# Patient Record
Sex: MALE | Race: WHITE | URBAN - NONMETROPOLITAN AREA
[De-identification: names, ages, dates, MRNs, and addresses within clinical notes are randomized per-mention and may not be internally consistent; named-entity substitution may affect disease eponyms.]

---

## 2024-09-27 ENCOUNTER — DOCUMENTATION (OUTPATIENT)
Dept: CARDIOLOGY | Facility: CLINIC | Age: 69
End: 2024-09-27
Payer: COMMERCIAL

## 2024-10-03 ENCOUNTER — TELEPHONE (OUTPATIENT)
Dept: CARDIOLOGY | Facility: CLINIC | Age: 69
End: 2024-10-03

## 2024-10-03 DIAGNOSIS — I47.29 MONOMORPHIC VENTRICULAR TACHYCARDIA (MULTI): ICD-10-CM

## 2024-10-03 NOTE — TELEPHONE ENCOUNTER
Patient admitted to [facility] on 9/27/2024; Chief complaint: Anterior STEMI.    Underwent Cardiac Cath 9/27/2024: POST PROCEDURE  Cardiology Post-Op Diagnosis: STEMI (Thrombotic occlusion proximal LAD, mild 30-50% RCA disease, moderate LV dysfunction)    Underwent Angioplasty 9/27/2024; POST PROCEDURE  Cardiology Post-Op Diagnosis: STEMI (Successful mechanical thrombectomy, PCI proximal LAD with 4 x 38 mm Philipp)    Update: 9/29/2024; Case discussed with Dr. Kumar/APS.  Based on recurrent sustained VT  48 hours after indexed myocardial ischemic event on background of ischemic cardiomyopathy patient will need a dual AICD which hopefully will be arranged for Wednesday.    Underwent ICD implant 10/2/2024;   PRE-OP DIAGNOSIS  Sustained monomorphic ventricular tachycardia  POST-OP DIAGNOSIS  Sustained monomorphic ventricular tachycardia  PROCEDURE(S) PERFORMED  Implant of an Abbott dual-chamber cardiac defibrillator    IMPLANT(S)  ICD pulse generator is Abbott model CD DR A 500 Q serial #[id number] implanted October 2, 2024    Atrial lead is and with 2088 length 46 cm serial number EE [id number] implanted October 2, 2024    ICD lead is Abbott  [id number].  Length is 56 cm serial number EE [id number] planted October 2, 2024  FINDINGS  Atrial lead pacing threshold 1 V at 0.5 ms pulse width sensing amplitude 4.3 mV and impedance 440 ohms    ICD lead pacing threshold 0.75 at 0.5 ms pulse width and sensing 4.3 mV and     impedance 440 ohms    Device programmed to the DDDR lower rate 60    AT monitor zone 150 bpm.  VT therapy zone 171 bpm.  VF detection and therapy zone to 14 bpm    At discharge patient needs: PAC[place] [facility] [place] EARLY OFFICE VISIT POST MI AND  WEEK POST ICD IMPLANT FOLLOW UP.

## 2024-10-04 ENCOUNTER — PATIENT OUTREACH (OUTPATIENT)
Dept: CARDIOLOGY | Facility: CLINIC | Age: 69
End: 2024-10-04
Payer: MEDICARE

## 2024-10-04 ENCOUNTER — TELEPHONE (OUTPATIENT)
Dept: CARDIOLOGY | Facility: CLINIC | Age: 69
End: 2024-10-04
Payer: MEDICARE

## 2024-10-04 RX ORDER — ASPIRIN 81 MG/1
81 TABLET ORAL DAILY
COMMUNITY

## 2024-10-04 RX ORDER — OMEPRAZOLE 20 MG/1
20 CAPSULE, DELAYED RELEASE ORAL
COMMUNITY

## 2024-10-04 RX ORDER — NITROGLYCERIN 0.4 MG/1
0.4 TABLET SUBLINGUAL EVERY 5 MIN PRN
COMMUNITY

## 2024-10-04 RX ORDER — ATORVASTATIN CALCIUM 80 MG/1
80 TABLET, FILM COATED ORAL DAILY
COMMUNITY

## 2024-10-04 RX ORDER — CARVEDILOL 6.25 MG/1
6.25 TABLET ORAL 2 TIMES DAILY
COMMUNITY

## 2024-10-04 RX ORDER — BUPROPION HYDROCHLORIDE 300 MG/1
300 TABLET ORAL DAILY
COMMUNITY

## 2024-10-04 RX ORDER — GABAPENTIN 800 MG/1
800 TABLET ORAL DAILY
COMMUNITY

## 2024-10-04 RX ORDER — AMIODARONE HYDROCHLORIDE 200 MG/1
200 TABLET ORAL 2 TIMES DAILY
COMMUNITY

## 2024-10-04 RX ORDER — LISINOPRIL 5 MG/1
5 TABLET ORAL DAILY
COMMUNITY

## 2024-10-04 NOTE — PROGRESS NOTES
Discharge Facility:  [facility]     Discharge Diagnosis:  STEMI  V Tach  YRIS  S/P PCI to the LAD 9/27/24  S/P AICD 10/2/24    Admission Date:  9/27/24  Discharge Date:   10/3/24    PCP Appointment Date:  TBD  Specialist Appointment:   10/9/24 Dr Sheppard   [facility] Encounter and Summary Linked: Yes    Two attempts were made to reach patient within two business days after discharge. I left voicemail to introduce myself and the TCM program to Venkat Cohen. I encouraged patient to contact office or myself for follow up appt. I gave my contact information to return my call so we can go over discharge instructions and see if I can assist in anyway.

## 2024-10-04 NOTE — TELEPHONE ENCOUNTER
Kamilah from Dr. Reyna's office calls stating patient has not had a bowel movement since discharge. He is only experiencing clear fluid and he is not eating anything either. Patient had heart cath when hospitalized and a defibrillator placed. She states patient also mentioned Sob when lying down as well. She states she was calling patient to direct him to go to ER.

## 2024-10-04 NOTE — TELEPHONE ENCOUNTER
In Dr. Lev Sheppard DO absence, please advise    Patient's wife phones stating pt was discharged from [facility] yesterday after having an anterior STEMI in which he had stents placed by Dr. Lev Sheppard DO. Wife states patient also saw Dr. Markus Jennings MD. Discharge summary available under media.     Patient is complaining of SOB when lying down and wet sounding lungs which then improves when sitting up or walking around. He is denying any other cardiac complaints at this time.     His wife states he was given lasix when hospitalized and she is wondering if he should be prescribed more to help with symptoms?    Patient scheduled with Dr. Lev Sheppard DO 10/9 for hospital follow up.

## 2024-10-07 NOTE — TELEPHONE ENCOUNTER
Spoke with patient, he states he saw pcp this morning who feels SOB is related to brilinta as well. Patient has pending OV 10/9. They are inquiring on alternative to brilinta. Please advise

## 2024-10-08 NOTE — TELEPHONE ENCOUNTER
Wife phones back inquiring on status of medications. Informed her meds were sent to Dr. Lev Sheppard DO for signature and once he signs them she will be able to pick them up.

## 2024-10-08 NOTE — TELEPHONE ENCOUNTER
Spoke with wife regarding recommendations. She is agreeable to have patient start medications. She voices concerns with regards to the brilinta causing the SOB. Informed wife patient needs to remain on brilinta and SOB could be likely related to LV dysfunction.    Patient has pending OV 10/9 where wife would like to discuss more.     Orders prepped and sent to Dr. Lev Sheppard DO for signature.     When lab order is signed please fax to [facility]

## 2024-10-09 ENCOUNTER — APPOINTMENT (OUTPATIENT)
Dept: CARDIOLOGY | Facility: CLINIC | Age: 69
End: 2024-10-09
Payer: COMMERCIAL

## 2024-10-09 VITALS
HEART RATE: 60 BPM | WEIGHT: 226.6 LBS | DIASTOLIC BLOOD PRESSURE: 60 MMHG | SYSTOLIC BLOOD PRESSURE: 98 MMHG | BODY MASS INDEX: 32.44 KG/M2 | HEIGHT: 70 IN

## 2024-10-09 DIAGNOSIS — R07.9 CHEST PAIN, UNSPECIFIED TYPE: ICD-10-CM

## 2024-10-09 DIAGNOSIS — I51.9 LV DYSFUNCTION: ICD-10-CM

## 2024-10-09 DIAGNOSIS — G47.33 OBSTRUCTIVE SLEEP APNEA: ICD-10-CM

## 2024-10-09 DIAGNOSIS — I47.29 VENTRICULAR TACHYCARDIA, MONOMORPHIC (MULTI): ICD-10-CM

## 2024-10-09 DIAGNOSIS — R06.02 SHORTNESS OF BREATH: ICD-10-CM

## 2024-10-09 DIAGNOSIS — I25.10 CORONARY ARTERY DISEASE, UNSPECIFIED VESSEL OR LESION TYPE, UNSPECIFIED WHETHER ANGINA PRESENT, UNSPECIFIED WHETHER NATIVE OR TRANSPLANTED HEART: ICD-10-CM

## 2024-10-09 DIAGNOSIS — Z78.9 NEVER SMOKED TOBACCO: ICD-10-CM

## 2024-10-09 DIAGNOSIS — I25.5 CARDIOMYOPATHY, ISCHEMIC: ICD-10-CM

## 2024-10-09 DIAGNOSIS — I21.3 ST ELEVATION MYOCARDIAL INFARCTION (STEMI), UNSPECIFIED ARTERY (MULTI): ICD-10-CM

## 2024-10-09 DIAGNOSIS — Z98.61 HISTORY OF PTCA: ICD-10-CM

## 2024-10-09 DIAGNOSIS — Z95.810 ICD (IMPLANTABLE CARDIOVERTER-DEFIBRILLATOR) IN PLACE: ICD-10-CM

## 2024-10-09 PROBLEM — I47.20 VENTRICULAR TACHYCARDIA (MULTI): Status: ACTIVE | Noted: 2024-10-09

## 2024-10-09 PROBLEM — I51.89 LEFT VENTRICULAR SYSTOLIC DYSFUNCTION (LVSD): Status: ACTIVE | Noted: 2024-10-09

## 2024-10-09 RX ORDER — SPIRONOLACTONE 25 MG/1
12.5 TABLET ORAL DAILY
Qty: 45 TABLET | Refills: 3 | Status: SHIPPED | OUTPATIENT
Start: 2024-10-09 | End: 2024-10-10 | Stop reason: DRUGHIGH

## 2024-10-09 RX ORDER — LOSARTAN POTASSIUM 25 MG/1
12.5 TABLET ORAL DAILY
Qty: 45 TABLET | Refills: 3 | Status: SHIPPED | OUTPATIENT
Start: 2024-10-09 | End: 2025-10-09

## 2024-10-09 ASSESSMENT — ENCOUNTER SYMPTOMS: SHORTNESS OF BREATH: 1

## 2024-10-09 NOTE — LETTER
October 9, 2024     Leon Reyna DO  [address]  [place] [address]    Patient: Venkat Cohen   YOB: 1955   Date of Visit: 10/9/2024       Dear Dr. Leon Reyna DO:    Thank you for referring Venkat Cohen to me for evaluation. Below are my notes for this consultation.  If you have questions, please do not hesitate to call me. I look forward to following your patient along with you.       Sincerely,     Lev Sheppard DO      CC: No Recipients  ______________________________________________________________________________________    Subjective   Venkat Cohen is a 69 y.o. male       Chief Complaint    Follow-up          69-year-old gentleman returns for TCM office visit status post large anteroapical ST elevation MI treated with mechanical thrombectomy, and stenting of the proximal/mid LAD.  He has overall moderately severe LV dysfunction, evidence of nonsustained ventricular tachycardia and underwent AICD treatment and placement prior to discharge.  He has had no further events.    Upon discharge he had significant exertional dyspnea, inability to sleep, all of which have improved over the past 48 hours.    Remains on amiodarone in addition to DAPT.  Additionally he is on lisinopril and has significant cough and shortness of breath which potentially could be related to his lisinopril.  He had significant orthopnea and dyspnea but again improving.    Today's ECG reveals sinus rhythm with atrial pacing, right bundle branch configuration with residual anterolateral ST elevation consistent with possible aneurysmal formation.    We did spend a lot of time discussing medications, side effects, LV dysfunction and heart failure versus dyspnea associated with medications and cough.    Recommendations: Will discontinue lisinopril and initiate losartan 12.5 mg daily, he has not taken his spironolactone or furosemide as of yet and will also reinitiate Aldactone 12.5 mg daily, schedule  "MUGA scan for next 8 weeks, follow-up in 12 weeks for further adjudication.         Review of Systems   Respiratory:  Positive for shortness of breath.    All other systems reviewed and are negative.           Vitals:    10/09/24 1129   BP: 98/60   BP Location: Right arm   Patient Position: Sitting   Pulse: 60   Weight: 103 kg (226 lb 9.6 oz)   Height: 1.765 m (5' 9.5\")    EKG done in office today      Objective   Physical Exam  Constitutional:       Appearance: Normal appearance.   HENT:      Nose: Nose normal.   Neck:      Vascular: No carotid bruit.   Cardiovascular:      Rate and Rhythm: Normal rate.      Pulses: Normal pulses.      Heart sounds: Normal heart sounds.   Pulmonary:      Effort: Pulmonary effort is normal.      Breath sounds: Normal breath sounds.      Comments: Shortness of breath   Abdominal:      General: Bowel sounds are normal.      Palpations: Abdomen is soft.   Musculoskeletal:         General: Normal range of motion.      Cervical back: Normal range of motion.      Right lower leg: No edema.      Left lower leg: No edema.   Skin:     General: Skin is warm and dry.   Neurological:      General: No focal deficit present.      Mental Status: He is alert.   Psychiatric:         Mood and Affect: Mood normal.         Behavior: Behavior normal.         Thought Content: Thought content normal.         Judgment: Judgment normal.         Allergies  Ibuprofen     Current Medications    Current Outpatient Medications:   •  amiodarone (Pacerone) 200 mg tablet, Take 1 tablet (200 mg) by mouth 2 times a day., Disp: , Rfl:   •  aspirin 81 mg EC tablet, Take 1 tablet (81 mg) by mouth once daily., Disp: , Rfl:   •  atorvastatin (Lipitor) 80 mg tablet, Take 1 tablet (80 mg) by mouth once daily., Disp: , Rfl:   •  buPROPion XL (Wellbutrin XL) 300 mg 24 hr tablet, Take 1 tablet (300 mg) by mouth once daily. Do not crush, chew, or split., Disp: , Rfl:   •  carvedilol (Coreg) 6.25 mg tablet, Take 1 tablet (6.25 " mg) by mouth 2 times a day., Disp: , Rfl:   •  furosemide (Lasix) 20 mg tablet, Take 1 tablet (20 mg) by mouth once daily., Disp: 90 tablet, Rfl: 3  •  gabapentin (Neurontin) 800 mg tablet, Take 1 tablet (800 mg) by mouth 3 times a day., Disp: , Rfl:   •  nitroglycerin (Nitrostat) 0.4 mg SL tablet, Place 1 tablet (0.4 mg) under the tongue every 5 minutes if needed for chest pain., Disp: , Rfl:   •  omeprazole (PriLOSEC) 20 mg DR capsule, Take 1 capsule (20 mg) by mouth once daily in the morning. Take before meals. Do not crush or chew., Disp: , Rfl:   •  spironolactone (Aldactone) 25 mg tablet, Take 1 tablet (25 mg) by mouth once daily., Disp: 90 tablet, Rfl: 3  •  ticagrelor (Brilinta) 90 mg tablet, Take 1 tablet (90 mg) by mouth 2 times a day., Disp: , Rfl:                      Assessment/Plan   1. Coronary artery disease, unspecified vessel or lesion type, unspecified whether angina present, unspecified whether native or transplanted heart        2. History of PTCA        3. Cardiomyopathy, ischemic        4. ST elevation myocardial infarction (STEMI), unspecified artery (Multi)        5. ICD (implantable cardioverter-defibrillator) in place        6. Ventricular tachycardia, monomorphic (Multi)        7. Shortness of breath        8. Chest pain, unspecified type        9. Obstructive sleep apnea        10. BMI 32.0-32.9,adult        11. Never smoked tobacco                 Scribe Attestation  By signing my name below, I, Susan ANDRADE RN   , Scribe   attest that this documentation has been prepared under the direction and in the presence of Lev Sheppard DO.     Provider Attestation - Scribe documentation    All medical record entries made by the Scribe were at my direction and personally dictated by me. I have reviewed the chart and agree that the record accurately reflects my personal performance of the history, physical exam, discussion and plan.

## 2024-10-09 NOTE — PATIENT INSTRUCTIONS
Please bring all medicines, vitamins, and herbal supplements with you when you come to the office.    Prescriptions will not be filled unless you are compliant with your follow up appointments or have a follow up appointment scheduled as per instruction of your physician. Refills should be requested at the time of your visit.     BMI was above normal measurement. Current weight: 103 kg (226 lb 9.6 oz)  Weight change since last visit (-) denotes wt loss 226.6 lbs   Weight loss needed to achieve BMI 25: 55.2 Lbs  Weight loss needed to achieve BMI 30: 20.9 Lbs  Provided instructions on dietary changes  Provided instructions on exercise.    Pacemaker/Defibrillator follow up per routine     Stop lisinopril and start losartan 12.5 mg daily     Spironolactone 12.5 mg daily     Hold lasix for now

## 2024-10-09 NOTE — PROGRESS NOTES
"Subjective   Venkat Cohen is a 69 y.o. male       Chief Complaint    Follow-up          69-year-old gentleman returns for TCM office visit status post large anteroapical ST elevation MI treated with mechanical thrombectomy, and stenting of the proximal/mid LAD.  He has overall moderately severe LV dysfunction, evidence of nonsustained ventricular tachycardia and underwent AICD treatment and placement prior to discharge.  He has had no further events.    Upon discharge he had significant exertional dyspnea, inability to sleep, all of which have improved over the past 48 hours.    Remains on amiodarone in addition to DAPT.  Additionally he is on lisinopril and has significant cough and shortness of breath which potentially could be related to his lisinopril.  He had significant orthopnea and dyspnea but again improving.    Today's ECG reveals sinus rhythm with atrial pacing, right bundle branch configuration with residual anterolateral ST elevation consistent with possible aneurysmal formation.    We did spend a lot of time discussing medications, side effects, LV dysfunction and heart failure versus dyspnea associated with medications and cough.    Recommendations: Will discontinue lisinopril and initiate losartan 12.5 mg daily, he has not taken his spironolactone or furosemide as of yet and will also reinitiate Aldactone 12.5 mg daily, schedule MUGA scan for next 8 weeks, follow-up in 12 weeks for further adjudication.         Review of Systems   Respiratory:  Positive for shortness of breath.    All other systems reviewed and are negative.           Vitals:    10/09/24 1129   BP: 98/60   BP Location: Right arm   Patient Position: Sitting   Pulse: 60   Weight: 103 kg (226 lb 9.6 oz)   Height: 1.765 m (5' 9.5\")    EKG done in office today      Objective   Physical Exam  Constitutional:       Appearance: Normal appearance.   HENT:      Nose: Nose normal.   Neck:      Vascular: No carotid bruit.   Cardiovascular:      " Rate and Rhythm: Normal rate.      Pulses: Normal pulses.      Heart sounds: Normal heart sounds.   Pulmonary:      Effort: Pulmonary effort is normal.      Breath sounds: Normal breath sounds.      Comments: Shortness of breath   Abdominal:      General: Bowel sounds are normal.      Palpations: Abdomen is soft.   Musculoskeletal:         General: Normal range of motion.      Cervical back: Normal range of motion.      Right lower leg: No edema.      Left lower leg: No edema.   Skin:     General: Skin is warm and dry.   Neurological:      General: No focal deficit present.      Mental Status: He is alert.   Psychiatric:         Mood and Affect: Mood normal.         Behavior: Behavior normal.         Thought Content: Thought content normal.         Judgment: Judgment normal.         Allergies  Ibuprofen     Current Medications    Current Outpatient Medications:     amiodarone (Pacerone) 200 mg tablet, Take 1 tablet (200 mg) by mouth 2 times a day., Disp: , Rfl:     aspirin 81 mg EC tablet, Take 1 tablet (81 mg) by mouth once daily., Disp: , Rfl:     atorvastatin (Lipitor) 80 mg tablet, Take 1 tablet (80 mg) by mouth once daily., Disp: , Rfl:     buPROPion XL (Wellbutrin XL) 300 mg 24 hr tablet, Take 1 tablet (300 mg) by mouth once daily. Do not crush, chew, or split., Disp: , Rfl:     carvedilol (Coreg) 6.25 mg tablet, Take 1 tablet (6.25 mg) by mouth 2 times a day., Disp: , Rfl:     furosemide (Lasix) 20 mg tablet, Take 1 tablet (20 mg) by mouth once daily., Disp: 90 tablet, Rfl: 3    gabapentin (Neurontin) 800 mg tablet, Take 1 tablet (800 mg) by mouth 3 times a day., Disp: , Rfl:     nitroglycerin (Nitrostat) 0.4 mg SL tablet, Place 1 tablet (0.4 mg) under the tongue every 5 minutes if needed for chest pain., Disp: , Rfl:     omeprazole (PriLOSEC) 20 mg DR capsule, Take 1 capsule (20 mg) by mouth once daily in the morning. Take before meals. Do not crush or chew., Disp: , Rfl:     spironolactone (Aldactone) 25 mg  tablet, Take 1 tablet (25 mg) by mouth once daily., Disp: 90 tablet, Rfl: 3    ticagrelor (Brilinta) 90 mg tablet, Take 1 tablet (90 mg) by mouth 2 times a day., Disp: , Rfl:                      Assessment/Plan   1. Coronary artery disease, unspecified vessel or lesion type, unspecified whether angina present, unspecified whether native or transplanted heart        2. History of PTCA        3. Cardiomyopathy, ischemic        4. ST elevation myocardial infarction (STEMI), unspecified artery (Multi)        5. ICD (implantable cardioverter-defibrillator) in place        6. Ventricular tachycardia, monomorphic (Multi)        7. Shortness of breath        8. Chest pain, unspecified type        9. Obstructive sleep apnea        10. BMI 32.0-32.9,adult        11. Never smoked tobacco                 Scribe Attestation  By signing my name below, I, Susan ANDRADE RN   , Scribe   attest that this documentation has been prepared under the direction and in the presence of Lev Sheppard DO.     Provider Attestation - Scribe documentation    All medical record entries made by the Scribe were at my direction and personally dictated by me. I have reviewed the chart and agree that the record accurately reflects my personal performance of the history, physical exam, discussion and plan.

## 2024-10-09 NOTE — TELEPHONE ENCOUNTER
Lab order printed to be given to patient at OV today 10/9    Also faxed to [facility]

## 2024-10-10 NOTE — TELEPHONE ENCOUNTER
Wife phones in stating patient's shortness of breath and coughing has worsened. She states this is the worst night he has had since he was discharged from the hospital. He feels very fatigued and reports that he does not feel he is getting all of the oxygen that he needs.    Instructed patient if symptoms continue to wrosen to report to ER, they verbalized understanding and state they do not want to go back to the ER if nothing will be done.    They have made the med adjustments that were recommended from his visit yesterday but they are inquiring if he can be taken off of brilinta and placed on something else. Please advise. They are concerned the SOB and cough could be from brilinta.

## 2024-10-10 NOTE — TELEPHONE ENCOUNTER
Informed wife of recommendation to increase spironolactone to 25 mg and to stop brilinta and initiate plavix with loading dose of 600 mg x1 and then 75 mg each day after, she verbalized understanding.     Orders prepped and sent to Dr. Lev Sheppard DO for signature.     Patient will call next week with update on condition. Instructed should symptoms worsen in the meantime to report to ER, they verbalized understanding.

## 2024-10-18 ENCOUNTER — PATIENT OUTREACH (OUTPATIENT)
Dept: PRIMARY CARE | Facility: CLINIC | Age: 69
End: 2024-10-18
Payer: MEDICARE

## 2024-10-18 NOTE — PROGRESS NOTES
Call regarding appt. with Cardio on 10/9/24 after hospitalization.  At time of outreach call the patient feels as if their condition has improved since last visit.  Reviewed the appointments with the pt and addressed any questions or concerns.    Starts rehab next Tuesday

## 2024-10-21 ENCOUNTER — TELEPHONE (OUTPATIENT)
Dept: CARDIOLOGY | Facility: CLINIC | Age: 69
End: 2024-10-21
Payer: MEDICARE

## 2024-10-21 ENCOUNTER — PATIENT MESSAGE (OUTPATIENT)
Dept: CARDIOLOGY | Facility: CLINIC | Age: 69
End: 2024-10-21
Payer: MEDICARE

## 2024-10-21 NOTE — TELEPHONE ENCOUNTER
Faxed completed FMLA forms for spouse to [facility] [employer] as requested. Confirmation received. Phoned spouse with update.

## 2024-10-21 NOTE — TELEPHONE ENCOUNTER
Patient wife Brittany Cohen FMLA forms completed and placed on Dr. Lev Sheppard, DO desk for signature. Nursing will need to document and fax, then scan to chart once faxed.

## 2024-10-23 DIAGNOSIS — I51.9 LV DYSFUNCTION: ICD-10-CM

## 2024-10-23 NOTE — TELEPHONE ENCOUNTER
Patient spouse phones requesting review of recent blood work that was ordered by family doctor. Spouse states there were concerns regarding kidney function and it was thought to possibly be related to diuretic use. Please advise

## 2024-10-24 RX ORDER — FUROSEMIDE 20 MG/1
20 TABLET ORAL AS NEEDED
Start: 2024-10-24 | End: 2025-10-24

## 2024-10-24 NOTE — TELEPHONE ENCOUNTER
Spoke with patient and informed him Dr. Lev Sheppard, DO recommends he hold lasix and only use as needed. Patient is agreeable. Patient states he is having repeat blood work done 11/5 from pcp to re-evaluate labs. Will call with further concerns.

## 2024-10-30 DIAGNOSIS — R06.02 SHORTNESS OF BREATH: ICD-10-CM

## 2024-10-30 DIAGNOSIS — I51.9 LV DYSFUNCTION: ICD-10-CM

## 2024-10-31 RX ORDER — SPIRONOLACTONE 25 MG/1
25 TABLET ORAL 2 TIMES DAILY
Qty: 180 TABLET | Refills: 3 | Status: SHIPPED | OUTPATIENT
Start: 2024-10-31 | End: 2025-10-31

## 2024-11-04 LAB
NON-UH HIE ANION GAP: 12.2 (ref 6–15)
NON-UH HIE B-TYPE NATRIURETIC PEPTIDE: 1424 PG/ML (ref 5–100)
NON-UH HIE BLOOD UREA NITROGEN: 26 MG/DL (ref 7–25)
NON-UH HIE CALCIUM: 9.9 MG/DL (ref 8.6–10.3)
NON-UH HIE CARBON DIOXIDE: 20.2 MMOL/L (ref 21–31)
NON-UH HIE CHLORIDE: 106 MMOL/L (ref 98–107)
NON-UH HIE CREATININE: 1.68 MG/DL (ref 0.7–1.3)
NON-UH HIE ESTIMATED GFR: 43.45
NON-UH HIE GLUCOSE: 80 MG/DL (ref 70–100)
NON-UH HIE POTASSIUM: 4.4 MMOL/L (ref 3.5–5.1)
NON-UH HIE SODIUM: 134 MMOL/L (ref 136–145)

## 2024-11-15 ENCOUNTER — APPOINTMENT (OUTPATIENT)
Dept: CARDIOLOGY | Facility: CLINIC | Age: 69
End: 2024-11-15
Payer: MEDICARE

## 2024-11-15 VITALS
HEIGHT: 69 IN | WEIGHT: 208 LBS | HEART RATE: 60 BPM | DIASTOLIC BLOOD PRESSURE: 60 MMHG | SYSTOLIC BLOOD PRESSURE: 98 MMHG | BODY MASS INDEX: 30.81 KG/M2

## 2024-11-15 DIAGNOSIS — I21.3 ST ELEVATION MYOCARDIAL INFARCTION (STEMI), UNSPECIFIED ARTERY (MULTI): ICD-10-CM

## 2024-11-15 DIAGNOSIS — I47.29 VENTRICULAR TACHYCARDIA, MONOMORPHIC (MULTI): ICD-10-CM

## 2024-11-15 DIAGNOSIS — R06.02 SHORTNESS OF BREATH: ICD-10-CM

## 2024-11-15 DIAGNOSIS — I25.5 CARDIOMYOPATHY, ISCHEMIC: ICD-10-CM

## 2024-11-15 DIAGNOSIS — Z95.810 ICD (IMPLANTABLE CARDIOVERTER-DEFIBRILLATOR) IN PLACE: ICD-10-CM

## 2024-11-15 DIAGNOSIS — I25.10 ASHD (ARTERIOSCLEROTIC HEART DISEASE): ICD-10-CM

## 2024-11-15 DIAGNOSIS — Z79.899 HIGH RISK MEDICATION USE: ICD-10-CM

## 2024-11-15 DIAGNOSIS — I50.43 CHF (CONGESTIVE HEART FAILURE), NYHA CLASS III, ACUTE ON CHRONIC, COMBINED: ICD-10-CM

## 2024-11-15 DIAGNOSIS — Z78.9 NEVER SMOKED TOBACCO: ICD-10-CM

## 2024-11-15 DIAGNOSIS — Z98.61 HISTORY OF PTCA: ICD-10-CM

## 2024-11-15 PROCEDURE — 1159F MED LIST DOCD IN RCRD: CPT | Performed by: INTERNAL MEDICINE

## 2024-11-15 PROCEDURE — 3008F BODY MASS INDEX DOCD: CPT | Performed by: INTERNAL MEDICINE

## 2024-11-15 PROCEDURE — 1160F RVW MEDS BY RX/DR IN RCRD: CPT | Performed by: INTERNAL MEDICINE

## 2024-11-15 PROCEDURE — 99215 OFFICE O/P EST HI 40 MIN: CPT | Performed by: INTERNAL MEDICINE

## 2024-11-15 PROCEDURE — 1036F TOBACCO NON-USER: CPT | Performed by: INTERNAL MEDICINE

## 2024-11-15 PROCEDURE — 93000 ELECTROCARDIOGRAM COMPLETE: CPT | Performed by: INTERNAL MEDICINE

## 2024-11-15 RX ORDER — FUROSEMIDE 40 MG/1
40 TABLET ORAL DAILY
Qty: 90 TABLET | Refills: 3 | Status: SHIPPED | OUTPATIENT
Start: 2024-11-15 | End: 2025-11-15

## 2024-11-15 RX ORDER — BUMETANIDE 1 MG/1
1 TABLET ORAL DAILY
COMMUNITY
End: 2024-11-15 | Stop reason: ALTCHOICE

## 2024-11-15 ASSESSMENT — ENCOUNTER SYMPTOMS: SHORTNESS OF BREATH: 1

## 2024-11-15 NOTE — PATIENT INSTRUCTIONS
Please bring all medicines, vitamins, and herbal supplements with you when you come to the office.    Prescriptions will not be filled unless you are compliant with your follow up appointments or have a follow up appointment scheduled as per instruction of your physician. Refills should be requested at the time of your visit.   BMI was above normal measurement. Current weight: 94.3 kg (208 lb)  Weight change since last visit (-) denotes wt loss -18.6 lbs   Weight loss needed to achieve BMI 25: 39.1 Lbs  Weight loss needed to achieve BMI 30: 5.3 Lbs  Advised to Increase physical activity.  Pacemaker/Defibrillator follow up per routine   Amiodarone follow up per routine

## 2024-11-15 NOTE — PROGRESS NOTES
Subjective   Venkat Cohen is a 69 y.o. male       Chief Complaint    Follow-up          69-year-old gentleman returns for follow-up following recent large anterior ST elevation MI treated with primary revascularization of the proximal LAD with a large 4 mm drug-eluting stent and mechanical thrombectomy, complicated by no reflow phenomenon and runs of nonsustained VT.  He ended up with severe LV dysfunction.  Subsequent AICD was placed due to ventricular arrhythmias    1 week after discharge, he ended up back in the hospital with recurrence of pulmonary edema and heart failure treated effectively with aggressive diuresis.  His original event occurred September 27, today is now November 15 approximately 6 to 7 weeks postop.    After discharge, he went home on furosemide, followed up with his family physician, laboratories ordered including renal profile and BMP which noted acute renal insufficiency with creatinine 1.68 and elevated BNP of 1400.  At that time Lasix was held due to his renal insufficiency and as he was feeling so well and asymptomatic.    He has been participating in cardiac rehab 3 times a week, today he was feeling more fatigued and short of breath.  End of last week he went to his brothers in [facility] and went hunting, and had progressive disability, shortness of breath and fatigue and on the final day on Friday could not participate.  He went to the ER on Sunday, who was given Bumex for 7 days until he could see me.  Will need to review the ER reports however acute coronary syndrome was ruled out.    Today's ECG reveals atrial pacing with sinus rhythm, right axis deviation, incomplete right bundle branch block and resolving anterior ST injury current.    Impression: Resolving acute decompensated left ventricular systolic heart failure, anterior STEMI (recent); NYHA functional class II-III/C (improving)    Recommendations: Reinitiate furosemide 40 mg daily, obtain chemistry and BMP next Friday,  "limited echocardiogram December, and follow-up in mid December.         Review of Systems   Respiratory:  Positive for shortness of breath.    All other systems reviewed and are negative.           Vitals:    11/15/24 1005   BP: 98/60   BP Location: Left arm   Patient Position: Sitting   Pulse: 60   Weight: 94.3 kg (208 lb)   Height: 1.753 m (5' 9\")      EKG done in office today   Objective   Physical Exam  Constitutional:       Appearance: Normal appearance.   HENT:      Nose: Nose normal.   Neck:      Vascular: No carotid bruit.   Cardiovascular:      Rate and Rhythm: Normal rate.      Pulses: Normal pulses.      Heart sounds: Normal heart sounds.   Pulmonary:      Effort: Pulmonary effort is normal.   Abdominal:      General: Bowel sounds are normal.      Palpations: Abdomen is soft.   Musculoskeletal:         General: Normal range of motion.      Cervical back: Normal range of motion.      Right lower leg: No edema.      Left lower leg: No edema.   Skin:     General: Skin is warm and dry.   Neurological:      General: No focal deficit present.      Mental Status: He is alert.   Psychiatric:         Mood and Affect: Mood normal.         Behavior: Behavior normal.         Thought Content: Thought content normal.         Judgment: Judgment normal.         Allergies  Ibuprofen     Current Medications    Current Outpatient Medications:     amiodarone (Pacerone) 200 mg tablet, Take 1 tablet (200 mg) by mouth 2 times a day., Disp: , Rfl:     aspirin 81 mg EC tablet, Take 1 tablet (81 mg) by mouth once daily., Disp: , Rfl:     atorvastatin (Lipitor) 80 mg tablet, Take 1 tablet (80 mg) by mouth once daily., Disp: , Rfl:     buPROPion XL (Wellbutrin XL) 300 mg 24 hr tablet, Take 1 tablet (300 mg) by mouth once daily. Do not crush, chew, or split., Disp: , Rfl:     carvedilol (Coreg) 6.25 mg tablet, Take 1 tablet (6.25 mg) by mouth 2 times a day., Disp: , Rfl:     clopidogrel (Plavix) 75 mg tablet, Take 1 tablet by mouth " once daily. On the first day of starting medication take 600 mg (8 tablets) by mouth then 1 tablet each day after., Disp: 90 tablet, Rfl: 3    gabapentin (Neurontin) 800 mg tablet, Take 1 tablet (800 mg) by mouth 3 times a day., Disp: , Rfl:     losartan (Cozaar) 25 mg tablet, Take 0.5 tablets (12.5 mg) by mouth once daily., Disp: 45 tablet, Rfl: 3    nitroglycerin (Nitrostat) 0.4 mg SL tablet, Place 1 tablet (0.4 mg) under the tongue every 5 minutes if needed for chest pain., Disp: , Rfl:     omeprazole (PriLOSEC) 20 mg DR capsule, Take 1 capsule (20 mg) by mouth once daily in the morning. Take before meals. Do not crush or chew., Disp: , Rfl:     spironolactone (Aldactone) 25 mg tablet, Take 1 tablet (25 mg) by mouth 2 times a day., Disp: 180 tablet, Rfl: 3                     Assessment/Plan   1. ASHD (arteriosclerotic heart disease)        2. CHF (congestive heart failure), NYHA class III, acute on chronic, combined        3. Ventricular tachycardia, monomorphic (Multi)        4. ST elevation myocardial infarction (STEMI), unspecified artery (Multi)        5. ICD (implantable cardioverter-defibrillator) in place        6. History of PTCA        7. Cardiomyopathy, ischemic        8. Shortness of breath        9. High risk medication use        10. BMI 30.0-30.9,adult        11. Never smoked tobacco                 Scribe Attestation  By signing my name below, IMarlen LPN, Scribe   attest that this documentation has been prepared under the direction and in the presence of Lev Sheppard DO.     Provider Attestation - Scribe documentation    All medical record entries made by the Scribe were at my direction and personally dictated by me. I have reviewed the chart and agree that the record accurately reflects my personal performance of the history, physical exam, discussion and plan.

## 2024-11-25 ENCOUNTER — PATIENT OUTREACH (OUTPATIENT)
Dept: CARDIOLOGY | Facility: CLINIC | Age: 69
End: 2024-11-25
Payer: MEDICARE

## 2024-11-25 NOTE — PROGRESS NOTES
Discharge Facility:  [facility]  Discharge Diagnosis: CHF  Admission Date: 11/20/24  Discharge Date:  11/24/24    PCP Appointment Date:   DEC 2  2024 02:15 PM - Office Visit  [facility] - Leon Reyna DO     Specialist Appointment Date:   DEC 3  2024 02:30 PM - Cardiology Hospital Discharge  [facility] - Lev Sheppard DO     Hospital Encounter and Summary Linked: Yes  See discharge assessment below for further details     Engagement  Call Start Time: 1011 (11/25/2024 10:25 AM)    Medications  Medications reviewed with patient/caregiver?: No (11/25/2024 10:25 AM)  Is the patient having any side effects they believe may be caused by any medication additions or changes?: No (11/25/2024 10:25 AM)  Does the patient have all medications ordered at discharge?: Yes (11/25/2024 10:25 AM)    Appointments  Does the patient have a primary care provider?: Yes (11/25/2024 10:25 AM)  Care Management Interventions: Verified appointment date/time/provider (11/25/2024 10:25 AM)  Care Management Interventions: Advised patient to keep appointment (11/25/2024 10:25 AM)    Self Management  What is the home health agency?: n/a (11/25/2024 10:25 AM)  What Durable Medical Equipment (DME) was ordered?: n/a (11/25/2024 10:25 AM)    Patient Teaching  Does the patient have access to their discharge instructions?: Yes (11/25/2024 10:25 AM)  Care Management Interventions: Reviewed instructions with patient (11/25/2024 10:25 AM)  What is the patient's perception of their health status since discharge?: Improving (11/25/2024 10:25 AM)  Is the patient/caregiver able to teach back the hierarchy of who to call/visit for symptoms/problems? PCP, Specialist, Home Health nurse, Urgent Care, ED, 911: Yes (11/25/2024 10:25 AM)    Wrap Up  Wrap Up Additional Comments: Patient states his breathing has improved since the hospital he has some difficulties with laying flat. Reviewed daily weights and Bp logs. Reviewed upcoming appts  (11/25/2024 10:25 AM)

## 2024-12-02 DIAGNOSIS — I25.5 CARDIOMYOPATHY, ISCHEMIC: ICD-10-CM

## 2024-12-02 DIAGNOSIS — I25.10 CORONARY ARTERY DISEASE, UNSPECIFIED VESSEL OR LESION TYPE, UNSPECIFIED WHETHER ANGINA PRESENT, UNSPECIFIED WHETHER NATIVE OR TRANSPLANTED HEART: ICD-10-CM

## 2024-12-02 LAB
NON-UH HIE ANION GAP: 15 MEQ/L (ref 6–15)
NON-UH HIE B-TYPE NATRIURETIC PEPTIDE: 837 PG/ML (ref 5–100)
NON-UH HIE BASOPHILS # (AUTO): 0 10*3/UL (ref 0–0.2)
NON-UH HIE BASOPHILS % (AUTO): 0.1 %
NON-UH HIE BLOOD UREA NITROGEN: 37 MG/DL (ref 7–25)
NON-UH HIE CALCIUM: 9.5 MG/DL (ref 8.6–10.3)
NON-UH HIE CARBON DIOXIDE: 26.6 MMOL/L (ref 21–31)
NON-UH HIE CHLORIDE: 99 MMOL/L (ref 98–107)
NON-UH HIE CREATININE: 1.54 MG/DL (ref 0.7–1.3)
NON-UH HIE EOSINOPHILS # (AUTO): 0 10*3/UL (ref 0–0.45)
NON-UH HIE EOSINOPHILS % (AUTO): 0.4 %
NON-UH HIE ESTIMATED GFR: 48.23 ML/MIN
NON-UH HIE GLUCOSE: 77 MG/DL (ref 70–100)
NON-UH HIE HEMATOCRIT: 39 % (ref 38.8–50)
NON-UH HIE HEMOGLOBIN: 12.8 G/DL (ref 13–17)
NON-UH HIE LYMPHOCYTES # (AUTO): 1 10*3/UL (ref 1–4.8)
NON-UH HIE LYMPHOCYTES % (AUTO): 8.8 %
NON-UH HIE MEAN CORPUSCULAR HEMOGLOBIN: 29.2 PG (ref 27.5–35.2)
NON-UH HIE MEAN CORPUSCULAR HGB CONC: 32.8 G/DL (ref 32.5–35.6)
NON-UH HIE MEAN CORPUSCULAR VOLUME: 89.1 FL (ref 83.5–101)
NON-UH HIE MEAN PLATELET VOLUME: 8.8 FL (ref 6.6–10.1)
NON-UH HIE MONOCYTES # (AUTO): 0.6 10*3/UL (ref 0–0.8)
NON-UH HIE MONOCYTES % (AUTO): 5.8 %
NON-UH HIE NEUTROPHILS # (AUTO): 9.3 10*3/UL (ref 1.8–7.7)
NON-UH HIE NEUTROPHILS % (AUTO): 84.9 %
NON-UH HIE NRBC%: 0 /100{WBC} (ref 0–0.5)
NON-UH HIE PLATELET COUNT: 404 10*3/UL (ref 150–450)
NON-UH HIE POTASSIUM: 4.6 MMOL/L (ref 3.5–5.1)
NON-UH HIE RED BLOOD COUNT: 4.38 10*6/UL (ref 3.9–5.6)
NON-UH HIE RED CELL DISTRIBUTION WIDTH: 15.7 % (ref 12–14.8)
NON-UH HIE SODIUM: 136 MMOL/L (ref 136–145)
NON-UH HIE UNCORRECTED WBC: 10.9 10*3/UL (ref 4.1–10.5)
NON-UH HIE WHITE BLOOD COUNT: 10.9 10*3/UL (ref 4.1–10.5)

## 2024-12-02 RX ORDER — LOSARTAN POTASSIUM 25 MG/1
25 TABLET ORAL DAILY
Qty: 90 TABLET | Refills: 1 | Status: CANCELLED | OUTPATIENT
Start: 2024-12-02 | End: 2025-12-02

## 2024-12-03 ENCOUNTER — APPOINTMENT (OUTPATIENT)
Dept: CARDIOLOGY | Facility: CLINIC | Age: 69
End: 2024-12-03
Payer: MEDICARE

## 2024-12-03 VITALS
BODY MASS INDEX: 28.73 KG/M2 | DIASTOLIC BLOOD PRESSURE: 52 MMHG | HEIGHT: 69 IN | SYSTOLIC BLOOD PRESSURE: 82 MMHG | WEIGHT: 194 LBS | HEART RATE: 60 BPM

## 2024-12-03 DIAGNOSIS — I47.29 VENTRICULAR TACHYCARDIA, MONOMORPHIC (MULTI): ICD-10-CM

## 2024-12-03 DIAGNOSIS — I25.10 CORONARY ARTERY DISEASE, UNSPECIFIED VESSEL OR LESION TYPE, UNSPECIFIED WHETHER ANGINA PRESENT, UNSPECIFIED WHETHER NATIVE OR TRANSPLANTED HEART: ICD-10-CM

## 2024-12-03 DIAGNOSIS — I51.9 LEFT VENTRICULAR SYSTOLIC DYSFUNCTION (LVSD): ICD-10-CM

## 2024-12-03 DIAGNOSIS — Z95.810 ICD (IMPLANTABLE CARDIOVERTER-DEFIBRILLATOR) IN PLACE: ICD-10-CM

## 2024-12-03 DIAGNOSIS — I50.43 CHF (CONGESTIVE HEART FAILURE), NYHA CLASS III, ACUTE ON CHRONIC, COMBINED: ICD-10-CM

## 2024-12-03 DIAGNOSIS — I21.3 ST ELEVATION MYOCARDIAL INFARCTION (STEMI), UNSPECIFIED ARTERY (MULTI): ICD-10-CM

## 2024-12-03 DIAGNOSIS — I25.5 CARDIOMYOPATHY, ISCHEMIC: ICD-10-CM

## 2024-12-03 DIAGNOSIS — Z98.61 HISTORY OF PTCA: ICD-10-CM

## 2024-12-03 DIAGNOSIS — I25.10 ASHD (ARTERIOSCLEROTIC HEART DISEASE): ICD-10-CM

## 2024-12-03 DIAGNOSIS — G47.33 OBSTRUCTIVE SLEEP APNEA: ICD-10-CM

## 2024-12-03 DIAGNOSIS — N17.9 ACUTE KIDNEY INJURY (CMS-HCC): ICD-10-CM

## 2024-12-03 PROBLEM — J90 PLEURAL EFFUSION: Status: ACTIVE | Noted: 2024-12-03

## 2024-12-03 PROCEDURE — 3008F BODY MASS INDEX DOCD: CPT | Performed by: INTERNAL MEDICINE

## 2024-12-03 PROCEDURE — 1159F MED LIST DOCD IN RCRD: CPT | Performed by: INTERNAL MEDICINE

## 2024-12-03 PROCEDURE — 99215 OFFICE O/P EST HI 40 MIN: CPT | Performed by: INTERNAL MEDICINE

## 2024-12-03 PROCEDURE — 1160F RVW MEDS BY RX/DR IN RCRD: CPT | Performed by: INTERNAL MEDICINE

## 2024-12-03 PROCEDURE — 93000 ELECTROCARDIOGRAM COMPLETE: CPT | Performed by: INTERNAL MEDICINE

## 2024-12-03 PROCEDURE — 1036F TOBACCO NON-USER: CPT | Performed by: INTERNAL MEDICINE

## 2024-12-03 RX ORDER — PANTOPRAZOLE SODIUM 40 MG/1
40 TABLET, DELAYED RELEASE ORAL DAILY
COMMUNITY
Start: 2024-10-17 | End: 2025-01-15

## 2024-12-03 RX ORDER — CARVEDILOL 3.12 MG/1
3.12 TABLET ORAL 2 TIMES DAILY
COMMUNITY
Start: 2024-11-10

## 2024-12-03 RX ORDER — LOSARTAN POTASSIUM 25 MG/1
25 TABLET ORAL DAILY
Qty: 90 TABLET | Refills: 0 | Status: SHIPPED | OUTPATIENT
Start: 2024-12-03 | End: 2025-12-03

## 2024-12-03 ASSESSMENT — ENCOUNTER SYMPTOMS: DIZZINESS: 1

## 2024-12-03 NOTE — PROGRESS NOTES
"Subjective   Venkat Cohen is a 69 y.o. male       Chief Complaint    Follow-up          69-year-old gentleman returns for TCM office visit following recent heart failure/acute respiratory failure symptomatology, overall greatly improved, without dyspnea, shortness of breath or exertional symptomatology.  He denies angina or nitrate usage.    He did go undergo bronchoscopy with biopsy with Dr. Orellana during his hospitalization details per Dr. Orellana's notes.  He was initiated on antibiotic and prednisone, additionally furosemide twice daily with excellent results.  Follow-up labs revealed improved BMP down to the 800s from 1500, and improved serum creatinine from 2.54 down to 1.68    All the above reviewed with patient and wife    We have adjudicated his medications, diuretics and afterload reduction therapies, as well as counseling on when to return to part-time work    Today's ECG reveals atrial pacing with appropriate sensing, right bundle branch configuration    Recommendations, follow-up with pulmonary medicine, continue with losartan 25 daily, decrease furosemide to 40 mg daily next week, will obtain chemistry, BMP in December along with echo that is already been ordered for December 18, and follow-up again in 12 weeks         Review of Systems   Neurological:  Positive for dizziness.   All other systems reviewed and are negative.           Vitals:    12/03/24 1449 12/03/24 1453   BP: 88/54 82/52   BP Location: Left arm Right arm   Patient Position: Sitting Standing   Pulse: 60    Weight: 88 kg (194 lb)    Height: 1.753 m (5' 9\")       EKG done in office today    Objective   Physical Exam  Constitutional:       Appearance: Normal appearance.   HENT:      Nose: Nose normal.   Neck:      Vascular: No carotid bruit.   Cardiovascular:      Rate and Rhythm: Normal rate.      Pulses: Normal pulses.      Heart sounds: Normal heart sounds.   Pulmonary:      Effort: Pulmonary effort is normal.   Abdominal:      " General: Bowel sounds are normal.      Palpations: Abdomen is soft.   Musculoskeletal:         General: Normal range of motion.      Cervical back: Normal range of motion.      Right lower leg: No edema.      Left lower leg: No edema.   Skin:     General: Skin is warm and dry.   Neurological:      General: No focal deficit present.      Mental Status: He is alert.   Psychiatric:         Mood and Affect: Mood normal.         Behavior: Behavior normal.         Thought Content: Thought content normal.         Judgment: Judgment normal.         Allergies  Ibuprofen     Current Medications    Current Outpatient Medications:     aspirin 81 mg EC tablet, Take 1 tablet (81 mg) by mouth once daily., Disp: , Rfl:     atorvastatin (Lipitor) 80 mg tablet, Take 1 tablet (80 mg) by mouth once daily., Disp: , Rfl:     buPROPion XL (Wellbutrin XL) 300 mg 24 hr tablet, Take 1 tablet (300 mg) by mouth once daily. Do not crush, chew, or split., Disp: , Rfl:     carvedilol (Coreg) 3.125 mg tablet, 1 tablet (3.125 mg) 2 times a day., Disp: , Rfl:     clopidogrel (Plavix) 75 mg tablet, Take 1 tablet by mouth once daily. On the first day of starting medication take 600 mg (8 tablets) by mouth then 1 tablet each day after., Disp: 90 tablet, Rfl: 3    furosemide (Lasix) 40 mg tablet, Take 1 tablet (40 mg) by mouth once daily., Disp: 90 tablet, Rfl: 3    gabapentin (Neurontin) 800 mg tablet, Take 1 tablet (800 mg) by mouth 3 times a day., Disp: , Rfl:     losartan (Cozaar) 25 mg tablet, Take 0.5 tablets (12.5 mg) by mouth once daily., Disp: 45 tablet, Rfl: 3    nitroglycerin (Nitrostat) 0.4 mg SL tablet, Place 1 tablet (0.4 mg) under the tongue every 5 minutes if needed for chest pain., Disp: , Rfl:     pantoprazole (ProtoNix) 40 mg EC tablet, Take 1 tablet (40 mg) by mouth early in the morning.., Disp: , Rfl:     spironolactone (Aldactone) 25 mg tablet, Take 1 tablet (25 mg) by mouth 2 times a day., Disp: 180 tablet, Rfl: 3                      Assessment/Plan   1. ASHD (arteriosclerotic heart disease)        2. History of PTCA        3. ST elevation myocardial infarction (STEMI), unspecified artery (Multi)        4. Ventricular tachycardia, monomorphic (Multi)        5. CHF (congestive heart failure), NYHA class III, acute on chronic, combined        6. Cardiomyopathy, ischemic        7. Left ventricular systolic dysfunction (LVSD)        8. ICD (implantable cardioverter-defibrillator) in place        9. Acute kidney injury (CMS-HCC)        10. Obstructive sleep apnea        11. BMI 28.0-28.9,adult                 Scribe Attestation  By signing my name below, I, Amanda DE LUNA LPN  , Scribe   attest that this documentation has been prepared under the direction and in the presence of Lev Sheppard DO.     Provider Attestation - Scribe documentation    All medical record entries made by the Scribe were at my direction and personally dictated by me. I have reviewed the chart and agree that the record accurately reflects my personal performance of the history, physical exam, discussion and plan.

## 2024-12-03 NOTE — PATIENT INSTRUCTIONS
Please bring all medicines, vitamins, and herbal supplements with you when you come to the office.    Prescriptions will not be filled unless you are compliant with your follow up appointments or have a follow up appointment scheduled as per instruction of your physician. Refills should be requested at the time of your visit.     BMI was above normal measurement. Current weight: 88 kg (194 lb)  Weight change since last visit (-) denotes wt loss -14 lbs   Weight loss needed to achieve BMI 25: 25.1 Lbs  Weight loss needed to achieve BMI 30: -8.7 Lbs  Provided instructions on dietary changes.    Pacemaker/Defibrillator follow up per routine     Lasix 40mg twice daily until Monday 12/09/2024 then take only once daily there after, unless there is a weight gain or swelling, if so then switch to twice daily again and notify the office     Patient to continue the losartan at 25mg daily, if Systolic blood pressure goes below 8, then lower to 1/2 tablet daily and notify the office     Will discuss part time work after the holidays, Jan 2025

## 2024-12-03 NOTE — LETTER
December 3, 2024     Leon Reyna DO  [address]  [place] [address]    Patient: Venkat Cohen   YOB: 1955   Date of Visit: 12/3/2024       Dear Dr. Leon Reyna, :    Thank you for referring Venkat Cohen to me for evaluation. Below are my notes for this consultation.  If you have questions, please do not hesitate to call me. I look forward to following your patient along with you.       Sincerely,     Lev Sheppard,       CC: No Recipients  ______________________________________________________________________________________    Subjective   Venkat Cohen is a 69 y.o. male       Chief Complaint    Follow-up          69-year-old gentleman returns for TCM office visit following recent heart failure/acute respiratory failure symptomatology, overall greatly improved, without dyspnea, shortness of breath or exertional symptomatology.  He denies angina or nitrate usage.    He did go undergo bronchoscopy with biopsy with Dr. Orellana during his hospitalization details per Dr. Orellana's notes.  He was initiated on antibiotic and prednisone, additionally furosemide twice daily with excellent results.  Follow-up labs revealed improved BMP down to the 800s from 1500, and improved serum creatinine from 2.54 down to 1.68    All the above reviewed with patient and wife    We have adjudicated his medications, diuretics and afterload reduction therapies, as well as counseling on when to return to part-time work    Today's ECG reveals atrial pacing with appropriate sensing, right bundle branch configuration    Recommendations, follow-up with pulmonary medicine, continue with losartan 25 daily, decrease furosemide to 40 mg daily next week, will obtain chemistry, BMP in December along with echo that is already been ordered for December 18, and follow-up again in 12 weeks         Review of Systems   Neurological:  Positive for dizziness.   All other systems reviewed and are negative.    "        Vitals:    12/03/24 1449 12/03/24 1453   BP: 88/54 82/52   BP Location: Left arm Right arm   Patient Position: Sitting Standing   Pulse: 60    Weight: 88 kg (194 lb)    Height: 1.753 m (5' 9\")       EKG done in office today    Objective   Physical Exam  Constitutional:       Appearance: Normal appearance.   HENT:      Nose: Nose normal.   Neck:      Vascular: No carotid bruit.   Cardiovascular:      Rate and Rhythm: Normal rate.      Pulses: Normal pulses.      Heart sounds: Normal heart sounds.   Pulmonary:      Effort: Pulmonary effort is normal.   Abdominal:      General: Bowel sounds are normal.      Palpations: Abdomen is soft.   Musculoskeletal:         General: Normal range of motion.      Cervical back: Normal range of motion.      Right lower leg: No edema.      Left lower leg: No edema.   Skin:     General: Skin is warm and dry.   Neurological:      General: No focal deficit present.      Mental Status: He is alert.   Psychiatric:         Mood and Affect: Mood normal.         Behavior: Behavior normal.         Thought Content: Thought content normal.         Judgment: Judgment normal.         Allergies  Ibuprofen     Current Medications    Current Outpatient Medications:   •  aspirin 81 mg EC tablet, Take 1 tablet (81 mg) by mouth once daily., Disp: , Rfl:   •  atorvastatin (Lipitor) 80 mg tablet, Take 1 tablet (80 mg) by mouth once daily., Disp: , Rfl:   •  buPROPion XL (Wellbutrin XL) 300 mg 24 hr tablet, Take 1 tablet (300 mg) by mouth once daily. Do not crush, chew, or split., Disp: , Rfl:   •  carvedilol (Coreg) 3.125 mg tablet, 1 tablet (3.125 mg) 2 times a day., Disp: , Rfl:   •  clopidogrel (Plavix) 75 mg tablet, Take 1 tablet by mouth once daily. On the first day of starting medication take 600 mg (8 tablets) by mouth then 1 tablet each day after., Disp: 90 tablet, Rfl: 3  •  furosemide (Lasix) 40 mg tablet, Take 1 tablet (40 mg) by mouth once daily., Disp: 90 tablet, Rfl: 3  •  gabapentin " (Neurontin) 800 mg tablet, Take 1 tablet (800 mg) by mouth 3 times a day., Disp: , Rfl:   •  losartan (Cozaar) 25 mg tablet, Take 0.5 tablets (12.5 mg) by mouth once daily., Disp: 45 tablet, Rfl: 3  •  nitroglycerin (Nitrostat) 0.4 mg SL tablet, Place 1 tablet (0.4 mg) under the tongue every 5 minutes if needed for chest pain., Disp: , Rfl:   •  pantoprazole (ProtoNix) 40 mg EC tablet, Take 1 tablet (40 mg) by mouth early in the morning.., Disp: , Rfl:   •  spironolactone (Aldactone) 25 mg tablet, Take 1 tablet (25 mg) by mouth 2 times a day., Disp: 180 tablet, Rfl: 3                     Assessment/Plan   1. ASHD (arteriosclerotic heart disease)        2. History of PTCA        3. ST elevation myocardial infarction (STEMI), unspecified artery (Multi)        4. Ventricular tachycardia, monomorphic (Multi)        5. CHF (congestive heart failure), NYHA class III, acute on chronic, combined        6. Cardiomyopathy, ischemic        7. Left ventricular systolic dysfunction (LVSD)        8. ICD (implantable cardioverter-defibrillator) in place        9. Acute kidney injury (CMS-HCC)        10. Obstructive sleep apnea        11. BMI 28.0-28.9,adult                 Scribe Attestation  By signing my name below, IAmanda LPN, Scribe   attest that this documentation has been prepared under the direction and in the presence of Lev Sheppard DO.     Provider Attestation - Scribe documentation    All medical record entries made by the Scribe were at my direction and personally dictated by me. I have reviewed the chart and agree that the record accurately reflects my personal performance of the history, physical exam, discussion and plan.

## 2024-12-04 ENCOUNTER — TELEPHONE (OUTPATIENT)
Dept: CARDIOLOGY | Facility: CLINIC | Age: 69
End: 2024-12-04
Payer: MEDICARE

## 2024-12-04 NOTE — TELEPHONE ENCOUNTER
----- Message from Lev Sheppard sent at 12/3/2024 12:52 PM EST -----  Abnormal Result. Please call patient with orders:

## 2024-12-04 NOTE — TELEPHONE ENCOUNTER
----- Message from Lev Sheppard sent at 12/3/2024 12:52 PM EST -----  Abnormal Result. Please call patient with orders:       No

## 2024-12-04 NOTE — TELEPHONE ENCOUNTER
Kidney function and heart failure numbers are looking much better   Written by Lev Sheppard DO on 12/3/2024 12:52 PM   Pt admitted with Hypoxemia

## 2024-12-04 NOTE — TELEPHONE ENCOUNTER
Result Communication    Resulted Orders   [facility] Complete Blood Count Auto Diff   Result Value Ref Range    [facility] White Blood Count 10.9 (H) 4.1 - 10.5 10*3/uL    [facility] Uncorrected WBC 10.9 (H) 4.1 - 10.5 10*3/uL    [facility] Red Blood Count 4.38 3.90 - 5.60 10*6/uL    [facility] Hemoglobin 12.8 (L) 13.0 - 17.0 g/dL    [facility] Hematocrit 39.0 38.8 - 50.0 %    [facility] Mean Corpuscular Volume 89.1 83.5 - 101 fL    [facility] Mean Corpuscular Hemoglobin 29.2 27.5 - 35.2 pg    [facility] Mean Corpuscular HGB Conc 32.8 32.5 - 35.6 g/dL    [facility] Red Cell Distribution Width 15.7 (H) 12.0 - 14.8 %    [facility] Platelet Count 404 150 - 450 10*3/uL    [facility] Mean Platelet Volume 8.8 6.6 - 10.1 fL    [facility] Neutrophils % (Auto) 84.9 . %    [facility] Lymphocytes % (Auto) 8.8 . %    [facility] Monocytes % (Auto) 5.8 . %    [facility] Eosinophils % (Auto) 0.4 . %    [facility] Basophils % (Auto) 0.1 . %    [facility] NRBC% 0.0 0 - 0.5 /100[WBC]    [facility] Neutrophils # (Auto) 9.3 (H) 1.8 - 7.7 10*3/uL    [facility] Lymphocytes # (Auto) 1.0 1.00 - 4.8 10*3/uL    [facility] Monocytes # (Auto) 0.6 0.0 - 0.8 10*3/uL    [facility] Eosinophils # (Auto) 0.0 0.0 - 0.45 10*3/uL    [facility] Basophils # (Auto) 0.0 0.0 - 0.2 10*3/uL      Comment:      PERFORMED BY:[facility] [id number], [place] [id number] MEDICAL DIRECTORNICOLE MCCULLOUGH M.D.   [facility] B-Type Natriuretic Peptide   Result Value Ref Range    [facility] B-Type Natriuretic Peptide 837.0 (H) 5 - 100 pg/mL      Comment:      PERFORMED BY:[id number] [id number] MEDICAL DIRECTORNICOLE MCCULLOUGH M.D.   [facility] Basic Metabolic Panel   Result Value Ref Range    [facility] Glucose 77 70 - 100 mg/dL      Comment:         Random Glucose Reference Range is dependent on time and   content of last meal. Glucose of more  than 200 mg/dL in a   nonstressed, ambulatory subject supports the diagnosis of   Diabetes Mellitus.   ADA recommended reference range    [facility] Blood Urea Nitrogen 37 (H) 7 - 25 mg/dL    [facility] Creatinine 1.54 (H) 0.70 - 1.30 mg/dL    [facility] ESTIMATED GFR 48.227 mL/Min    [facility] Sodium 136 136 - 145 mmol/L    [facility] Potassium 4.6 3.5 - 5.1 mmol/L    [facility] Chloride 99 98 - 107 mmol/L    [facility] Carbon Dioxide 26.6 21.0 - 31.0 mmol/L    [facility] Anion Gap 15.0 6.0 - 15.0 meq/L    [facility] Calcium 9.5 8.6 - 10.3 mg/dL      Comment:      PERFORMED BY:[facility] [place], [place] [id number] MEDICAL DIRECTORNICOLE MCCULLOUGH M.D.       1:35 PM      Results were successfully communicated with the patient and they acknowledged their understanding.

## 2024-12-06 ENCOUNTER — PATIENT OUTREACH (OUTPATIENT)
Dept: CARDIOLOGY | Facility: CLINIC | Age: 69
End: 2024-12-06
Payer: MEDICARE

## 2024-12-06 NOTE — PROGRESS NOTES
Call regarding appt. with cardiology on 12/3/24 after hospitalization.  At time of outreach call the patient feels as if their condition has improved since last visit.  Reviewed the PCP appointment with the pt and addressed any questions or concerns. Patient is very pleased with how he is feeling

## 2024-12-18 ENCOUNTER — APPOINTMENT (OUTPATIENT)
Dept: CARDIOLOGY | Facility: CLINIC | Age: 69
End: 2024-12-18
Payer: MEDICARE

## 2024-12-18 ENCOUNTER — HOSPITAL ENCOUNTER (OUTPATIENT)
Dept: CARDIOLOGY | Facility: CLINIC | Age: 69
Discharge: HOME | End: 2024-12-18
Payer: MEDICARE

## 2024-12-18 VITALS
BODY MASS INDEX: 30.81 KG/M2 | WEIGHT: 208 LBS | DIASTOLIC BLOOD PRESSURE: 60 MMHG | SYSTOLIC BLOOD PRESSURE: 102 MMHG | HEIGHT: 69 IN

## 2024-12-18 DIAGNOSIS — R06.02 SHORTNESS OF BREATH: ICD-10-CM

## 2024-12-18 DIAGNOSIS — I47.29 VENTRICULAR TACHYCARDIA, MONOMORPHIC (MULTI): ICD-10-CM

## 2024-12-18 DIAGNOSIS — I25.10 ASHD (ARTERIOSCLEROTIC HEART DISEASE): ICD-10-CM

## 2024-12-18 DIAGNOSIS — I50.43 CHF (CONGESTIVE HEART FAILURE), NYHA CLASS III, ACUTE ON CHRONIC, COMBINED: ICD-10-CM

## 2024-12-18 DIAGNOSIS — I21.3 ST ELEVATION MYOCARDIAL INFARCTION (STEMI), UNSPECIFIED ARTERY (MULTI): ICD-10-CM

## 2024-12-18 DIAGNOSIS — I25.5 CARDIOMYOPATHY, ISCHEMIC: ICD-10-CM

## 2024-12-18 LAB
EJECTION FRACTION APICAL 4 CHAMBER: 35.1
EJECTION FRACTION: 39 %
LEFT VENTRICLE INTERNAL DIMENSION DIASTOLE: 5.97 CM (ref 3.5–6)
LEFT VENTRICULAR OUTFLOW TRACT DIAMETER: 2.5 CM
LV EJECTION FRACTION BIPLANE: 39 %

## 2024-12-18 PROCEDURE — 93308 TTE F-UP OR LMTD: CPT | Performed by: INTERNAL MEDICINE

## 2024-12-18 PROCEDURE — 93308 TTE F-UP OR LMTD: CPT

## 2024-12-23 LAB
NON-UH HIE ANION GAP: ABNORMAL MEQ/L (ref 6–15)
NON-UH HIE B-TYPE NATRIURETIC PEPTIDE: ABNORMAL PG/ML (ref 5–100)
NON-UH HIE BLOOD UREA NITROGEN: ABNORMAL MG/DL (ref 7–25)
NON-UH HIE CALCIUM: ABNORMAL MG/DL (ref 8.6–10.3)
NON-UH HIE CARBON DIOXIDE: ABNORMAL MMOL/L (ref 21–31)
NON-UH HIE CHLORIDE: ABNORMAL MMOL/L (ref 98–107)
NON-UH HIE CREATININE: ABNORMAL MG/DL (ref 0.7–1.3)
NON-UH HIE ESTIMATED GFR: ABNORMAL ML/MIN
NON-UH HIE GLUCOSE: ABNORMAL MG/DL (ref 70–100)
NON-UH HIE POTASSIUM: ABNORMAL MMOL/L (ref 3.5–5.1)
NON-UH HIE SODIUM: ABNORMAL MMOL/L (ref 136–145)

## 2025-01-06 ENCOUNTER — TELEPHONE (OUTPATIENT)
Dept: CARDIOLOGY | Facility: CLINIC | Age: 70
End: 2025-01-06
Payer: MEDICARE

## 2025-01-06 NOTE — TELEPHONE ENCOUNTER
Spouse phones inquiring on results from labs and echo patient had done at the end of December. Please review and advise.

## 2025-01-07 ENCOUNTER — DOCUMENTATION (OUTPATIENT)
Dept: PRIMARY CARE | Facility: CLINIC | Age: 70
End: 2025-01-07
Payer: MEDICARE

## 2025-01-07 ENCOUNTER — PATIENT OUTREACH (OUTPATIENT)
Dept: PRIMARY CARE | Facility: CLINIC | Age: 70
End: 2025-01-07
Payer: MEDICARE

## 2025-01-07 NOTE — PROGRESS NOTES
Patient returns call    Successful outreach to patient regarding hospitalization as patient continues TCM program.   At time of outreach call the patient feels as if their condition has improved since initial visit with PCP or specialist.  Questions or concerns addressed at this time with patient.   Provided contact information to patient if any further non-emergent needs arise.  Patient has seen by PCP and he has a CXR pending. Patient has been continuing with daily weights. Reviewed with patient that SOB and CP needs evaluated in ED. Patient denies CP or SOB at this time. Patient has been hunting and working as a .

## 2025-01-07 NOTE — TELEPHONE ENCOUNTER
Spoke with spouse and informed her of results. She verbalized understanding. States pt was having some sob yesterday which he went to pcp for. They directed him to increase lasix to an extra pill a day as needed for sob.

## 2025-01-10 ENCOUNTER — TELEPHONE (OUTPATIENT)
Dept: CARDIOLOGY | Facility: CLINIC | Age: 70
End: 2025-01-10
Payer: MEDICARE

## 2025-01-10 DIAGNOSIS — R63.5 WEIGHT GAIN: ICD-10-CM

## 2025-01-10 DIAGNOSIS — R06.02 SHORTNESS OF BREATH: ICD-10-CM

## 2025-01-10 NOTE — TELEPHONE ENCOUNTER
Phoned patient, he states his pcp directed him to increase lasix to twice a day. States his sob has decreased.     Lab orders prepped and sent to Dr. Lev Sheppard DO for signature     Once signed call patient and fax to [facility] [place]

## 2025-01-10 NOTE — TELEPHONE ENCOUNTER
----- Message from Alesha VINCENT sent at 1/10/2025  7:57 AM EST -----  Regarding: FW: Weight gain, shortness of breath    ----- Message -----  From: Lev Sheppard DO  Sent: 1/9/2025   4:45 PM EST  To: Alesha Pinto  Subject: Weight gain, shortness of breath                 Repeat Chem-6 and BNP given the above symptoms a cardiac rehab  ----- Message -----  From: Alesha Pinto  Sent: 1/8/2025   9:19 AM EST  To: Lev Sheppard DO

## 2025-01-15 ENCOUNTER — APPOINTMENT (OUTPATIENT)
Dept: CARDIOLOGY | Facility: CLINIC | Age: 70
End: 2025-01-15
Payer: MEDICARE

## 2025-01-22 ENCOUNTER — APPOINTMENT (OUTPATIENT)
Dept: CARDIOLOGY | Facility: CLINIC | Age: 70
End: 2025-01-22
Payer: MEDICARE

## 2025-02-03 ENCOUNTER — DOCUMENTATION (OUTPATIENT)
Dept: PRIMARY CARE | Facility: CLINIC | Age: 70
End: 2025-02-03
Payer: MEDICARE

## 2025-02-07 ENCOUNTER — PATIENT OUTREACH (OUTPATIENT)
Dept: CARDIOLOGY | Facility: CLINIC | Age: 70
End: 2025-02-07
Payer: MEDICARE

## 2025-03-04 LAB
NON-UH HIE ANION GAP: 11.4 MEQ/L (ref 6–15)
NON-UH HIE B-TYPE NATRIURETIC PEPTIDE: 1224 PG/ML (ref 5–100)
NON-UH HIE BLOOD UREA NITROGEN: 29 MG/DL (ref 7–25)
NON-UH HIE CALCIUM: 9.5 MG/DL (ref 8.6–10.3)
NON-UH HIE CARBON DIOXIDE: 23.9 MMOL/L (ref 21–31)
NON-UH HIE CHLORIDE: 105 MMOL/L (ref 98–107)
NON-UH HIE CREATININE: 1.58 MG/DL (ref 0.7–1.3)
NON-UH HIE ESTIMATED GFR: 46.48 ML/MIN
NON-UH HIE GLUCOSE: 97 MG/DL (ref 70–100)
NON-UH HIE POTASSIUM: 4.3 MMOL/L (ref 3.5–5.1)
NON-UH HIE SODIUM: 136 MMOL/L (ref 136–145)

## 2025-03-04 NOTE — RESULT ENCOUNTER NOTE
Abnormal Result. Please call patient with orders: BNP is up to 1200, slowly climbing; how is his weight, breathing pattern, edema?  If any of this is getting a little worse we can always increase the furosemide to twice a day for 10 days

## 2025-03-05 ENCOUNTER — APPOINTMENT (OUTPATIENT)
Dept: CARDIOLOGY | Facility: CLINIC | Age: 70
End: 2025-03-05
Payer: MEDICARE

## 2025-03-05 VITALS
DIASTOLIC BLOOD PRESSURE: 70 MMHG | HEIGHT: 70 IN | HEART RATE: 68 BPM | SYSTOLIC BLOOD PRESSURE: 120 MMHG | BODY MASS INDEX: 29.63 KG/M2 | WEIGHT: 207 LBS

## 2025-03-05 DIAGNOSIS — Z78.9 NEVER SMOKED TOBACCO: ICD-10-CM

## 2025-03-05 DIAGNOSIS — Z98.61 HISTORY OF PTCA: ICD-10-CM

## 2025-03-05 DIAGNOSIS — I25.5 CARDIOMYOPATHY, ISCHEMIC: ICD-10-CM

## 2025-03-05 DIAGNOSIS — I21.3 ST ELEVATION MYOCARDIAL INFARCTION (STEMI), UNSPECIFIED ARTERY (MULTI): ICD-10-CM

## 2025-03-05 DIAGNOSIS — I25.10 ASHD (ARTERIOSCLEROTIC HEART DISEASE): ICD-10-CM

## 2025-03-05 DIAGNOSIS — Z95.810 ICD (IMPLANTABLE CARDIOVERTER-DEFIBRILLATOR) IN PLACE: ICD-10-CM

## 2025-03-05 DIAGNOSIS — G47.33 OBSTRUCTIVE SLEEP APNEA: ICD-10-CM

## 2025-03-05 DIAGNOSIS — I47.29 VENTRICULAR TACHYCARDIA, MONOMORPHIC (MULTI): ICD-10-CM

## 2025-03-05 PROCEDURE — 99214 OFFICE O/P EST MOD 30 MIN: CPT | Performed by: INTERNAL MEDICINE

## 2025-03-05 PROCEDURE — 1160F RVW MEDS BY RX/DR IN RCRD: CPT | Performed by: INTERNAL MEDICINE

## 2025-03-05 PROCEDURE — 3008F BODY MASS INDEX DOCD: CPT | Performed by: INTERNAL MEDICINE

## 2025-03-05 PROCEDURE — 1036F TOBACCO NON-USER: CPT | Performed by: INTERNAL MEDICINE

## 2025-03-05 PROCEDURE — 1159F MED LIST DOCD IN RCRD: CPT | Performed by: INTERNAL MEDICINE

## 2025-03-05 RX ORDER — FERROUS SULFATE 325(65) MG
325 TABLET, DELAYED RELEASE (ENTERIC COATED) ORAL EVERY OTHER DAY
COMMUNITY

## 2025-03-05 ASSESSMENT — ENCOUNTER SYMPTOMS: SHORTNESS OF BREATH: 1

## 2025-03-05 NOTE — LETTER
March 5, 2025     Leon Reyna DO  [address]  [place] [address]    Patient: Venkat Cohen   YOB: 1955   Date of Visit: 3/5/2025       Dear Dr. Leon Reyna, :    Thank you for referring Venkat Cohen to me for evaluation. Below are my notes for this consultation.  If you have questions, please do not hesitate to call me. I look forward to following your patient along with you.       Sincerely,     Lev Sheppard DO      CC: No Recipients  ______________________________________________________________________________________    Subjective   Venkat Cohen is a 70 y.o. male       Chief Complaint    Follow-up; Coronary Artery Disease          70-year-old gentleman returns for 12-week follow-up and is improved overall with some exertional dyspnea and shortness of breath.  He denies any chest discomfort or angina.  He denies any defibrillator discharges.    BNP is slightly elevated at 1248, up from 803 months ago however he has no edema or nocturnal dyspnea.  He has been diagnosed with central sleep apnea and is undergoing evaluation with Dr. Shankar.    He does have severe ischemic cardiomyopathy with improved LV function, ejection fraction of approximately 40% on current GDMT.  Serum creatinine is now hovering around 1.6-1.7 which is apparently his new baseline he does see Dr. Dailey from nephrology who follows him for his CKD.    Details of his anterior MI, heart failure events, AICD as well as pharmacologic therapy are all reviewed.  He returns primarily for further surveillance given his proclivity for heart failure over the past year since his MI and variable renal function.  Again most recent echo is reviewed revealing ejection fraction visually estimated at 39%.    Recommendations, continue current therapies, follow-up again in 4 months    Coronary Artery Disease  Symptoms include chest pain and shortness of breath.        Review of Systems   Cardiovascular:  Positive for  "chest pain.   Respiratory:  Positive for shortness of breath.             Vitals:    03/05/25 1138   BP: 120/70   BP Location: Left arm   Patient Position: Sitting   Pulse: 68   Weight: 93.9 kg (207 lb)   Height: 1.778 m (5' 10\")        Objective   Physical Exam  Constitutional:       Appearance: Normal appearance.   HENT:      Nose: Nose normal.   Neck:      Vascular: No carotid bruit.   Cardiovascular:      Rate and Rhythm: Normal rate.      Pulses: Normal pulses.      Heart sounds: Normal heart sounds.   Pulmonary:      Effort: Pulmonary effort is normal.   Abdominal:      General: Bowel sounds are normal.      Palpations: Abdomen is soft.   Musculoskeletal:         General: Normal range of motion.      Cervical back: Normal range of motion.      Right lower leg: No edema.      Left lower leg: No edema.   Skin:     General: Skin is warm and dry.   Neurological:      General: No focal deficit present.      Mental Status: He is alert.   Psychiatric:         Mood and Affect: Mood normal.         Behavior: Behavior normal.         Thought Content: Thought content normal.         Judgment: Judgment normal.         Allergies  Ibuprofen     Current Medications    Current Outpatient Medications:   •  aspirin 81 mg EC tablet, Take 1 tablet (81 mg) by mouth once daily., Disp: , Rfl:   •  atorvastatin (Lipitor) 80 mg tablet, Take 1 tablet (80 mg) by mouth once daily., Disp: , Rfl:   •  buPROPion XL (Wellbutrin XL) 300 mg 24 hr tablet, Take 1 tablet (300 mg) by mouth once daily. Do not crush, chew, or split., Disp: , Rfl:   •  carvedilol (Coreg) 3.125 mg tablet, 1 tablet (3.125 mg) 2 times a day., Disp: , Rfl:   •  clopidogrel (Plavix) 75 mg tablet, Take 1 tablet by mouth once daily. On the first day of starting medication take 600 mg (8 tablets) by mouth then 1 tablet each day after., Disp: 90 tablet, Rfl: 3  •  ferrous sulfate 325 (65 Fe) mg EC tablet, Take 1 tablet by mouth every other day. Do not crush, chew, or split., " Disp: , Rfl:   •  furosemide (Lasix) 40 mg tablet, Take 1 tablet (40 mg) by mouth once daily., Disp: 90 tablet, Rfl: 3  •  gabapentin (Neurontin) 800 mg tablet, Take 1 tablet (800 mg) by mouth 3 times a day., Disp: , Rfl:   •  losartan (Cozaar) 25 mg tablet, Take 1 tablet (25 mg) by mouth once daily., Disp: 90 tablet, Rfl: 0  •  nitroglycerin (Nitrostat) 0.4 mg SL tablet, Place 1 tablet (0.4 mg) under the tongue every 5 minutes if needed for chest pain., Disp: , Rfl:   •  pantoprazole (ProtoNix) 40 mg EC tablet, Take 1 tablet (40 mg) by mouth early in the morning.., Disp: , Rfl:   •  spironolactone (Aldactone) 25 mg tablet, Take 1 tablet (25 mg) by mouth 2 times a day., Disp: 180 tablet, Rfl: 3                     Assessment/Plan   1. ASHD (arteriosclerotic heart disease)  Follow Up In Cardiology      2. Cardiomyopathy, ischemic        3. ST elevation myocardial infarction (STEMI), unspecified artery (Multi)        4. History of PTCA        5. Ventricular tachycardia, monomorphic (Multi)        6. ICD (implantable cardioverter-defibrillator) in place        7. Obstructive sleep apnea        8. Never smoked tobacco        9. BMI 29.0-29.9,adult                 Scribe Attestation  By signing my name below, I, Susan ANDRADE RN   , Scribe   attest that this documentation has been prepared under the direction and in the presence of Lev Sheppard DO.     Provider Attestation - Scribe documentation    All medical record entries made by the Scribe were at my direction and personally dictated by me. I have reviewed the chart and agree that the record accurately reflects my personal performance of the history, physical exam, discussion and plan.

## 2025-03-05 NOTE — PATIENT INSTRUCTIONS
Please bring all medicines, vitamins, and herbal supplements with you when you come to the office.    Prescriptions will not be filled unless you are compliant with your follow up appointments or have a follow up appointment scheduled as per instruction of your physician. Refills should be requested at the time of your visit.     BMI was above normal measurement. Current weight: 93.9 kg (207 lb)  Weight change since last visit (-) denotes wt loss -1 lbs   Weight loss needed to achieve BMI 25: 33.1 Lbs  Weight loss needed to achieve BMI 30: -1.6 Lbs  Provided instructions on dietary changes  Provided instructions on exercise.    Pacemaker/Defibrillator follow up per routine

## 2025-03-05 NOTE — PROGRESS NOTES
"Subjective   Venkat Cohen is a 70 y.o. male       Chief Complaint    Follow-up; Coronary Artery Disease          70-year-old gentleman returns for 12-week follow-up and is improved overall with some exertional dyspnea and shortness of breath.  He denies any chest discomfort or angina.  He denies any defibrillator discharges.    BNP is slightly elevated at 1248, up from 803 months ago however he has no edema or nocturnal dyspnea.  He has been diagnosed with central sleep apnea and is undergoing evaluation with Dr. Shankar.    He does have severe ischemic cardiomyopathy with improved LV function, ejection fraction of approximately 40% on current GDMT.  Serum creatinine is now hovering around 1.6-1.7 which is apparently his new baseline he does see Dr. Dailey from nephrology who follows him for his CKD.    Details of his anterior MI, heart failure events, AICD as well as pharmacologic therapy are all reviewed.  He returns primarily for further surveillance given his proclivity for heart failure over the past year since his MI and variable renal function.  Again most recent echo is reviewed revealing ejection fraction visually estimated at 39%.    Recommendations, continue current therapies, follow-up again in 4 months    Coronary Artery Disease  Symptoms include chest pain and shortness of breath.        Review of Systems   Cardiovascular:  Positive for chest pain.   Respiratory:  Positive for shortness of breath.             Vitals:    03/05/25 1138   BP: 120/70   BP Location: Left arm   Patient Position: Sitting   Pulse: 68   Weight: 93.9 kg (207 lb)   Height: 1.778 m (5' 10\")        Objective   Physical Exam  Constitutional:       Appearance: Normal appearance.   HENT:      Nose: Nose normal.   Neck:      Vascular: No carotid bruit.   Cardiovascular:      Rate and Rhythm: Normal rate.      Pulses: Normal pulses.      Heart sounds: Normal heart sounds.   Pulmonary:      Effort: Pulmonary effort is normal.   Abdominal: "      General: Bowel sounds are normal.      Palpations: Abdomen is soft.   Musculoskeletal:         General: Normal range of motion.      Cervical back: Normal range of motion.      Right lower leg: No edema.      Left lower leg: No edema.   Skin:     General: Skin is warm and dry.   Neurological:      General: No focal deficit present.      Mental Status: He is alert.   Psychiatric:         Mood and Affect: Mood normal.         Behavior: Behavior normal.         Thought Content: Thought content normal.         Judgment: Judgment normal.         Allergies  Ibuprofen     Current Medications    Current Outpatient Medications:     aspirin 81 mg EC tablet, Take 1 tablet (81 mg) by mouth once daily., Disp: , Rfl:     atorvastatin (Lipitor) 80 mg tablet, Take 1 tablet (80 mg) by mouth once daily., Disp: , Rfl:     buPROPion XL (Wellbutrin XL) 300 mg 24 hr tablet, Take 1 tablet (300 mg) by mouth once daily. Do not crush, chew, or split., Disp: , Rfl:     carvedilol (Coreg) 3.125 mg tablet, 1 tablet (3.125 mg) 2 times a day., Disp: , Rfl:     clopidogrel (Plavix) 75 mg tablet, Take 1 tablet by mouth once daily. On the first day of starting medication take 600 mg (8 tablets) by mouth then 1 tablet each day after., Disp: 90 tablet, Rfl: 3    ferrous sulfate 325 (65 Fe) mg EC tablet, Take 1 tablet by mouth every other day. Do not crush, chew, or split., Disp: , Rfl:     furosemide (Lasix) 40 mg tablet, Take 1 tablet (40 mg) by mouth once daily., Disp: 90 tablet, Rfl: 3    gabapentin (Neurontin) 800 mg tablet, Take 1 tablet (800 mg) by mouth 3 times a day., Disp: , Rfl:     losartan (Cozaar) 25 mg tablet, Take 1 tablet (25 mg) by mouth once daily., Disp: 90 tablet, Rfl: 0    nitroglycerin (Nitrostat) 0.4 mg SL tablet, Place 1 tablet (0.4 mg) under the tongue every 5 minutes if needed for chest pain., Disp: , Rfl:     pantoprazole (ProtoNix) 40 mg EC tablet, Take 1 tablet (40 mg) by mouth early in the morning.., Disp: , Rfl:      spironolactone (Aldactone) 25 mg tablet, Take 1 tablet (25 mg) by mouth 2 times a day., Disp: 180 tablet, Rfl: 3                     Assessment/Plan   1. ASHD (arteriosclerotic heart disease)  Follow Up In Cardiology      2. Cardiomyopathy, ischemic        3. ST elevation myocardial infarction (STEMI), unspecified artery (Multi)        4. History of PTCA        5. Ventricular tachycardia, monomorphic (Multi)        6. ICD (implantable cardioverter-defibrillator) in place        7. Obstructive sleep apnea        8. Never smoked tobacco        9. BMI 29.0-29.9,adult                 Scribe Attestation  By signing my name below, I, Susan ANDRADE RN   , Scribe   attest that this documentation has been prepared under the direction and in the presence of Lev Sheppard DO.     Provider Attestation - Scribe documentation    All medical record entries made by the Scribe were at my direction and personally dictated by me. I have reviewed the chart and agree that the record accurately reflects my personal performance of the history, physical exam, discussion and plan.

## 2025-03-07 ENCOUNTER — TELEPHONE (OUTPATIENT)
Dept: CARDIOLOGY | Facility: CLINIC | Age: 70
End: 2025-03-07
Payer: MEDICARE

## 2025-03-07 NOTE — TELEPHONE ENCOUNTER
Result Communication    Resulted Orders   [facility] Basic Metabolic Panel   Result Value Ref Range    [facility] Glucose 97 70 - 100 mg/dL      Comment:         Random Glucose Reference Range is dependent on time and   content of last meal. Glucose of more than 200 mg/dL in a   nonstressed, ambulatory subject supports the diagnosis of   Diabetes Mellitus.   ADA recommended reference range    [facility] Blood Urea Nitrogen 29 (H) 7 - 25 mg/dL    [facility] Creatinine 1.58 (H) 0.70 - 1.30 mg/dL    [facility] ESTIMATED GFR 46.475 mL/Min    [facility] Sodium 136 136 - 145 mmol/L    [facility] Potassium 4.3 3.5 - 5.1 mmol/L    [facility] Chloride 105 98 - 107 mmol/L    [facility] Carbon Dioxide 23.9 21.0 - 31.0 mmol/L    [facility] Anion Gap 11.4 6.0 - 15.0 meq/L    [facility] Calcium 9.5 8.6 - 10.3 mg/dL      Comment:      PERFORMED BY:[facility] [address] [address] [place], [place] [id number] MEDICAL DIRECTORNICOLE MCCULLOUGH M.D.   [facility] B-Type Natriuretic Peptide   Result Value Ref Range    [facility] B-Type Natriuretic Peptide 1224.0 (H) 5 - 100 pg/mL      Comment:      PERFORMED BY:[facility] [place], [place] [id number] MEDICAL DIRECTORNICOLE MCCULLOUGH M.D.

## 2025-03-07 NOTE — TELEPHONE ENCOUNTER
----- Message from Lev Sheppard sent at 3/4/2025  5:17 PM EST -----  Abnormal Result. Please call patient with orders: BNP is up to 1200, slowly climbing; how is his weight, breathing pattern, edema?  If any of this is getting a little worse we can always increase the furosemide to twice a day for 10 days

## 2025-03-19 DIAGNOSIS — I25.5 CARDIOMYOPATHY, ISCHEMIC: ICD-10-CM

## 2025-03-19 DIAGNOSIS — I25.10 CORONARY ARTERY DISEASE, UNSPECIFIED VESSEL OR LESION TYPE, UNSPECIFIED WHETHER ANGINA PRESENT, UNSPECIFIED WHETHER NATIVE OR TRANSPLANTED HEART: ICD-10-CM

## 2025-03-20 RX ORDER — LOSARTAN POTASSIUM 25 MG/1
25 TABLET ORAL DAILY
Qty: 90 TABLET | Refills: 3 | Status: SHIPPED | OUTPATIENT
Start: 2025-03-20 | End: 2026-03-20

## 2025-06-23 DIAGNOSIS — I50.43 CHF (CONGESTIVE HEART FAILURE), NYHA CLASS III, ACUTE ON CHRONIC, COMBINED: ICD-10-CM

## 2025-06-23 DIAGNOSIS — R06.02 SHORTNESS OF BREATH: ICD-10-CM

## 2025-06-23 NOTE — TELEPHONE ENCOUNTER
----- Message from Renée ALFORD sent at 6/20/2025  4:11 PM EDT -----  Regarding: FW: Labs and chest x-ray    ----- Message -----  From: Lev Sheppard DO  Sent: 6/17/2025   2:53 PM EDT  To: Renée Guallpa  Subject: Labs and chest x-ray                             Responding to letter from Ms. Cohen,    I agree with increasing Lasix to twice a day, BNP was elevated at 1400, chest x-ray with mild vascular congestion.  It is better to get ahead of this sooner than later.  I believe that we can go back to Lasix once a day after approximately 4 weeks and recheck labs and BNP at that  ----- Message -----  From: Renée Guallpa  Sent: 6/13/2025   4:04 PM EDT  To: Lev Sheppard DO

## 2025-06-23 NOTE — TELEPHONE ENCOUNTER
Patient's spouse phoned orders discussed verbalized understanding. New Rx sent local. Patient has lab orders at home to complete from PCP.

## 2025-06-24 RX ORDER — FUROSEMIDE 40 MG/1
40 TABLET ORAL
Qty: 180 TABLET | Refills: 3 | Status: SHIPPED | OUTPATIENT
Start: 2025-06-24 | End: 2026-06-24

## 2025-07-07 LAB
NON-UH HIE ANION GAP: 11.6 (ref 6–15)
NON-UH HIE B-TYPE NATRIURETIC PEPTIDE: 1258 PG/ML (ref 5–100)
NON-UH HIE BLOOD UREA NITROGEN: 23 MG/DL (ref 7–25)
NON-UH HIE CALCIUM: 9.4 MG/DL (ref 8.6–10.3)
NON-UH HIE CARBON DIOXIDE: 25.1 MMOL/L (ref 21–31)
NON-UH HIE CHLORIDE: 105 MMOL/L (ref 98–107)
NON-UH HIE CREATININE: 1.49 MG/DL (ref 0.7–1.3)
NON-UH HIE ESTIMATED GFR: 49.86
NON-UH HIE GLUCOSE: 140 MG/DL (ref 70–100)
NON-UH HIE POTASSIUM: 4.7 MMOL/L (ref 3.5–5.1)
NON-UH HIE SODIUM: 137 MMOL/L (ref 136–145)

## 2025-07-09 ENCOUNTER — APPOINTMENT (OUTPATIENT)
Dept: CARDIOLOGY | Facility: CLINIC | Age: 70
End: 2025-07-09
Payer: MEDICARE

## 2025-07-09 VITALS
SYSTOLIC BLOOD PRESSURE: 82 MMHG | HEART RATE: 64 BPM | WEIGHT: 197 LBS | BODY MASS INDEX: 28.2 KG/M2 | HEIGHT: 70 IN | DIASTOLIC BLOOD PRESSURE: 50 MMHG

## 2025-07-09 DIAGNOSIS — I50.43 CHF (CONGESTIVE HEART FAILURE), NYHA CLASS III, ACUTE ON CHRONIC, COMBINED: ICD-10-CM

## 2025-07-09 DIAGNOSIS — I25.10 ASHD (ARTERIOSCLEROTIC HEART DISEASE): ICD-10-CM

## 2025-07-09 DIAGNOSIS — R07.9 CHEST PAIN, UNSPECIFIED TYPE: ICD-10-CM

## 2025-07-09 DIAGNOSIS — I21.3 ST ELEVATION MYOCARDIAL INFARCTION (STEMI), UNSPECIFIED ARTERY (MULTI): ICD-10-CM

## 2025-07-09 DIAGNOSIS — Z95.810 ICD (IMPLANTABLE CARDIOVERTER-DEFIBRILLATOR) IN PLACE: ICD-10-CM

## 2025-07-09 DIAGNOSIS — G47.33 OBSTRUCTIVE SLEEP APNEA: ICD-10-CM

## 2025-07-09 DIAGNOSIS — R06.02 SHORTNESS OF BREATH: ICD-10-CM

## 2025-07-09 DIAGNOSIS — Z98.61 HISTORY OF PTCA: ICD-10-CM

## 2025-07-09 DIAGNOSIS — I25.5 CARDIOMYOPATHY, ISCHEMIC: ICD-10-CM

## 2025-07-09 DIAGNOSIS — Z78.9 NEVER SMOKED TOBACCO: ICD-10-CM

## 2025-07-09 DIAGNOSIS — I47.29 VENTRICULAR TACHYCARDIA, MONOMORPHIC (MULTI): ICD-10-CM

## 2025-07-09 DIAGNOSIS — I51.89 LEFT VENTRICULAR SYSTOLIC DYSFUNCTION (LVSD): ICD-10-CM

## 2025-07-09 PROCEDURE — 99215 OFFICE O/P EST HI 40 MIN: CPT | Performed by: INTERNAL MEDICINE

## 2025-07-09 PROCEDURE — 1036F TOBACCO NON-USER: CPT | Performed by: INTERNAL MEDICINE

## 2025-07-09 PROCEDURE — 1159F MED LIST DOCD IN RCRD: CPT | Performed by: INTERNAL MEDICINE

## 2025-07-09 PROCEDURE — 1160F RVW MEDS BY RX/DR IN RCRD: CPT | Performed by: INTERNAL MEDICINE

## 2025-07-09 PROCEDURE — 3008F BODY MASS INDEX DOCD: CPT | Performed by: INTERNAL MEDICINE

## 2025-07-09 RX ORDER — HYDROXYZINE HYDROCHLORIDE 25 MG/1
25 TABLET, FILM COATED ORAL NIGHTLY PRN
COMMUNITY

## 2025-07-09 RX ORDER — ERGOCALCIFEROL 1.25 MG/1
1.25 CAPSULE ORAL WEEKLY
COMMUNITY

## 2025-07-09 RX ORDER — FUROSEMIDE 40 MG/1
TABLET ORAL
Qty: 126 TABLET | Refills: 3 | Status: SHIPPED | OUTPATIENT
Start: 2025-07-09

## 2025-07-09 NOTE — PATIENT INSTRUCTIONS
Please bring all medicines, vitamins, and herbal supplements with you when you come to the office.    Prescriptions will not be filled unless you are compliant with your follow up appointments or have a follow up appointment scheduled as per instruction of your physician. Refills should be requested at the time of your visit.     BMI was above normal measurement. Current weight: 89.4 kg (197 lb)  Weight change since last visit (-) denotes wt loss -10 lbs   Weight loss needed to achieve BMI 25: 23.1 Lbs  Weight loss needed to achieve BMI 30: -11.6 Lbs  Provided instructions on dietary changes  Provided instructions on exercise.

## 2025-07-09 NOTE — LETTER
July 9, 2025     Leon Reyna DO  [address]  [place] [address]    Patient: Venkat Cohen   YOB: 1955   Date of Visit: 7/9/2025       Dear Dr. Leon Reyna, :    Thank you for referring Venkat Cohen to me for evaluation. Below are my notes for this consultation.  If you have questions, please do not hesitate to call me. I look forward to following your patient along with you.       Sincerely,     Lev Sheppard DO      CC: No Recipients  ______________________________________________________________________________________    Chief Complaint   Patient presents with   • Follow-up     4 month Follow up for Coronary Artery Disease        Subjective   Venkat Cohen is a 70 y.o. male     70-year-old gentleman here for 4-month cardiovascular visit, he is doing very well getting up to 20,000 steps a day with turkey hunting, is back to [employer] every day, up in the morning, he is logging around to 5 gallon jugs of water to make his own ice.  He has some mild orthostatic symptomatology when going from his knees and bending down to standing up with associated shortness of breath.  He recently had labs revealing BNP of 1247 and a chest x-ray with minimal pulmonary vascular congestion where we acted upon this and increased his furosemide to twice daily with clinical improvement as described on today's inspection and examination.    Repeat blood pressure check today, 82/54.  We have counseled him on monitoring blood pressure; being aware of hypotension and symptoms.  We personally counseled him on hydration especially out in the heat all day on a boat given his current medical therapies.  His most recent serum creatinine is 1.4 which is the best it has been for a while.    He has a history of large anterior ST elevation MI in September 2024 with revascularization of the LAD, persistent moderately severe ischemic cardiomyopathy with ejection fraction last measured by echo in  "December 2000 2439% by Hamilton's criteria.  He remains on appropriate GDMT as tolerable, functional class I/stage C    Recommendations, follow-up in 6 months with nurse practitioner, continue current therapies albeit we will decrease his furosemide down to 20 mg in the evening every other day continue the morning dose daily, increase free water intake, obtain MUGA scan in September (1 year anniversary).         Review of Systems   Constitutional: Positive for malaise/fatigue.   All other systems reviewed and are negative.           Vitals:    07/09/25 1052 07/09/25 1137   BP: 76/60 82/50   BP Location: Left arm Left arm   Patient Position: Sitting Sitting   Pulse: 64    Weight: 89.4 kg (197 lb)    Height: 1.778 m (5' 10\")         Objective   Physical Exam  Constitutional:       Appearance: Normal appearance.   HENT:      Nose: Nose normal.   Neck:      Vascular: No carotid bruit.   Cardiovascular:      Rate and Rhythm: Normal rate.      Pulses: Normal pulses.      Heart sounds: Normal heart sounds.   Pulmonary:      Effort: Pulmonary effort is normal.   Abdominal:      General: Bowel sounds are normal.      Palpations: Abdomen is soft.   Musculoskeletal:         General: Normal range of motion.      Cervical back: Normal range of motion.      Right lower leg: No edema.      Left lower leg: No edema.   Skin:     General: Skin is warm and dry.   Neurological:      General: No focal deficit present.      Mental Status: He is alert.   Psychiatric:         Mood and Affect: Mood normal.         Behavior: Behavior normal.         Thought Content: Thought content normal.         Judgment: Judgment normal.         Allergies  Ibuprofen     Current Medications  Current Outpatient Medications   Medication Instructions   • aspirin 81 mg, Daily   • atorvastatin (LIPITOR) 80 mg, Daily   • buPROPion XL (WELLBUTRIN XL) 300 mg, Daily   • carvedilol (COREG) 3.125 mg, 2 times daily   • clopidogrel (Plavix) 75 mg tablet Take 1 tablet by " mouth once daily. On the first day of starting medication take 600 mg (8 tablets) by mouth then 1 tablet each day after.   • ergocalciferol (VITAMIN D-2) 1.25 mg, Weekly   • furosemide (LASIX) 40 mg, oral, 2 times daily (morning and late afternoon)   • gabapentin (NEURONTIN) 800 mg, 3 times daily   • hydrOXYzine HCL (ATARAX) 25 mg, Nightly PRN   • losartan (COZAAR) 25 mg, oral, Daily   • nitroglycerin (NITROSTAT) 0.4 mg, Every 5 min PRN   • pantoprazole (PROTONIX) 40 mg, Daily   • spironolactone (ALDACTONE) 25 mg, oral, 2 times daily                        Assessment/Plan   1. ST elevation myocardial infarction (STEMI), unspecified artery (Multi)        2. History of PTCA        3. Cardiomyopathy, ischemic        4. CHF (congestive heart failure), NYHA class III, acute on chronic, combined        5. ASHD (arteriosclerotic heart disease)  Follow Up In Cardiology      6. ICD (implantable cardioverter-defibrillator) in place        7. Left ventricular systolic dysfunction (LVSD)        8. Ventricular tachycardia, monomorphic (Multi)        9. Chest pain, unspecified type        10. Obstructive sleep apnea        11. Never smoked tobacco        12. BMI 28.0-28.9,adult        13. Shortness of breath                 Scribe Attestation  By signing my name below, ISusan RN   , Scribe   attest that this documentation has been prepared under the direction and in the presence of Lev Sheppard DO.     Provider Attestation - Scribe documentation    All medical record entries made by the Scribe were at my direction and personally dictated by me. I have reviewed the chart and agree that the record accurately reflects my personal performance of the history, physical exam, discussion and plan.

## 2025-07-09 NOTE — PROGRESS NOTES
Chief Complaint   Patient presents with    Follow-up     4 month Follow up for Coronary Artery Disease        Subjective   Venkat Cohen is a 70 y.o. male     70-year-old gentleman here for 4-month cardiovascular visit, he is doing very well getting up to 20,000 steps a day with turkey hunting, [facility], up in the morning, he is logging around to 5 gallon jugs of water to make his own ice.  He has some mild orthostatic symptomatology when going from his knees and bending down to standing up with associated shortness of breath.  He recently had labs revealing BNP of 1247 and a chest x-ray with minimal pulmonary vascular congestion where we acted upon this and increased his furosemide to twice daily with clinical improvement as described on today's inspection and examination.    Repeat blood pressure check today, 82/54.  We have counseled him on monitoring blood pressure; being aware of hypotension and symptoms.  We personally counseled him on hydration especially out in the heat all day on a boat given his current medical therapies.  His most recent serum creatinine is 1.4 which is the best it has been for a while.    He has a history of large anterior ST elevation MI in September 2024 with revascularization of the LAD, persistent moderately severe ischemic cardiomyopathy with ejection fraction last measured by echo in December 2000 2439% by Hamilton's criteria.  He remains on appropriate GDMT as tolerable, functional class I/stage C    Recommendations, follow-up in 6 months with nurse practitioner, continue current therapies albeit we will decrease his furosemide down to 20 mg in the evening every other day continue the morning dose daily, increase free water intake, obtain MUGA scan in September (1 year anniversary).         Review of Systems   Constitutional: Positive for malaise/fatigue.   All other systems reviewed and are negative.           Vitals:    07/09/25 1052 07/09/25 1137  "  BP: 76/60 82/50   BP Location: Left arm Left arm   Patient Position: Sitting Sitting   Pulse: 64    Weight: 89.4 kg (197 lb)    Height: 1.778 m (5' 10\")         Objective   Physical Exam  Constitutional:       Appearance: Normal appearance.   HENT:      Nose: Nose normal.   Neck:      Vascular: No carotid bruit.   Cardiovascular:      Rate and Rhythm: Normal rate.      Pulses: Normal pulses.      Heart sounds: Normal heart sounds.   Pulmonary:      Effort: Pulmonary effort is normal.   Abdominal:      General: Bowel sounds are normal.      Palpations: Abdomen is soft.   Musculoskeletal:         General: Normal range of motion.      Cervical back: Normal range of motion.      Right lower leg: No edema.      Left lower leg: No edema.   Skin:     General: Skin is warm and dry.   Neurological:      General: No focal deficit present.      Mental Status: He is alert.   Psychiatric:         Mood and Affect: Mood normal.         Behavior: Behavior normal.         Thought Content: Thought content normal.         Judgment: Judgment normal.         Allergies  Ibuprofen     Current Medications  Current Outpatient Medications   Medication Instructions    aspirin 81 mg, Daily    atorvastatin (LIPITOR) 80 mg, Daily    buPROPion XL (WELLBUTRIN XL) 300 mg, Daily    carvedilol (COREG) 3.125 mg, 2 times daily    clopidogrel (Plavix) 75 mg tablet Take 1 tablet by mouth once daily. On the first day of starting medication take 600 mg (8 tablets) by mouth then 1 tablet each day after.    ergocalciferol (VITAMIN D-2) 1.25 mg, Weekly    furosemide (LASIX) 40 mg, oral, 2 times daily (morning and late afternoon)    gabapentin (NEURONTIN) 800 mg, 3 times daily    hydrOXYzine HCL (ATARAX) 25 mg, Nightly PRN    losartan (COZAAR) 25 mg, oral, Daily    nitroglycerin (NITROSTAT) 0.4 mg, Every 5 min PRN    pantoprazole (PROTONIX) 40 mg, Daily    spironolactone (ALDACTONE) 25 mg, oral, 2 times daily                        Assessment/Plan   1. ST " elevation myocardial infarction (STEMI), unspecified artery (Multi)        2. History of PTCA        3. Cardiomyopathy, ischemic        4. CHF (congestive heart failure), NYHA class III, acute on chronic, combined        5. ASHD (arteriosclerotic heart disease)  Follow Up In Cardiology      6. ICD (implantable cardioverter-defibrillator) in place        7. Left ventricular systolic dysfunction (LVSD)        8. Ventricular tachycardia, monomorphic (Multi)        9. Chest pain, unspecified type        10. Obstructive sleep apnea        11. Never smoked tobacco        12. BMI 28.0-28.9,adult        13. Shortness of breath                 Scribe Attestation  By signing my name below, I, Susan ANDRADE RN   , Scribe   attest that this documentation has been prepared under the direction and in the presence of Lev Sheppard DO.     Provider Attestation - Scribe documentation    All medical record entries made by the Scribe were at my direction and personally dictated by me. I have reviewed the chart and agree that the record accurately reflects my personal performance of the history, physical exam, discussion and plan.

## 2026-01-19 ENCOUNTER — APPOINTMENT (OUTPATIENT)
Dept: CARDIOLOGY | Facility: CLINIC | Age: 71
End: 2026-01-19
Payer: MEDICARE